# Patient Record
Sex: FEMALE | Race: WHITE | NOT HISPANIC OR LATINO | Employment: FULL TIME | URBAN - METROPOLITAN AREA
[De-identification: names, ages, dates, MRNs, and addresses within clinical notes are randomized per-mention and may not be internally consistent; named-entity substitution may affect disease eponyms.]

---

## 2017-04-13 ENCOUNTER — HOSPITAL ENCOUNTER (OUTPATIENT)
Dept: LAB | Facility: MEDICAL CENTER | Age: 37
End: 2017-04-13
Attending: OBSTETRICS & GYNECOLOGY
Payer: COMMERCIAL

## 2017-04-13 LAB
HBV SURFACE AB SERPL IA-ACNC: >1000 MIU/ML (ref 0–10)
HIV 1+2 AB+HIV1 P24 AG SERPL QL IA: NON REACTIVE
TREPONEMA PALLIDUM IGG+IGM AB [PRESENCE] IN SERUM OR PLASMA BY IMMUNOASSAY: NON REACTIVE

## 2017-04-13 PROCEDURE — 86706 HEP B SURFACE ANTIBODY: CPT

## 2017-04-13 PROCEDURE — 87340 HEPATITIS B SURFACE AG IA: CPT

## 2017-04-13 PROCEDURE — 87389 HIV-1 AG W/HIV-1&-2 AB AG IA: CPT

## 2017-04-13 PROCEDURE — 86780 TREPONEMA PALLIDUM: CPT

## 2017-04-15 LAB — HBV SURFACE AG SER QL: NEGATIVE

## 2017-08-16 ENCOUNTER — HOSPITAL ENCOUNTER (OUTPATIENT)
Facility: MEDICAL CENTER | Age: 37
End: 2017-08-16
Attending: FAMILY MEDICINE
Payer: COMMERCIAL

## 2017-10-27 ENCOUNTER — NON-PROVIDER VISIT (OUTPATIENT)
Dept: URGENT CARE | Facility: CLINIC | Age: 37
End: 2017-10-27

## 2017-10-27 DIAGNOSIS — Z02.1 PRE-EMPLOYMENT DRUG SCREENING: ICD-10-CM

## 2017-10-27 LAB
AMP AMPHETAMINE: NORMAL
COC COCAINE: NORMAL
INT CON NEG: NORMAL
INT CON POS: NORMAL
MET METHAMPHETAMINES: NORMAL
OPI OPIATES: NORMAL
PCP PHENCYCLIDINE: NORMAL
POC DRUG COMMENT 753798-OCCUPATIONAL HEALTH: NORMAL
THC: NORMAL

## 2017-10-27 PROCEDURE — 80305 DRUG TEST PRSMV DIR OPT OBS: CPT | Performed by: PHYSICIAN ASSISTANT

## 2018-07-06 DIAGNOSIS — Z01.812 PRE-OPERATIVE LABORATORY EXAMINATION: ICD-10-CM

## 2018-07-06 LAB
ANION GAP SERPL CALC-SCNC: 8 MMOL/L (ref 0–11.9)
BUN SERPL-MCNC: 10 MG/DL (ref 8–22)
CALCIUM SERPL-MCNC: 9.4 MG/DL (ref 8.5–10.5)
CHLORIDE SERPL-SCNC: 103 MMOL/L (ref 96–112)
CO2 SERPL-SCNC: 24 MMOL/L (ref 20–33)
CREAT SERPL-MCNC: 0.69 MG/DL (ref 0.5–1.4)
ERYTHROCYTE [DISTWIDTH] IN BLOOD BY AUTOMATED COUNT: 40.9 FL (ref 35.9–50)
GLUCOSE SERPL-MCNC: 133 MG/DL (ref 65–99)
HCT VFR BLD AUTO: 39.2 % (ref 37–47)
HGB BLD-MCNC: 13.1 G/DL (ref 12–16)
MCH RBC QN AUTO: 30.5 PG (ref 27–33)
MCHC RBC AUTO-ENTMCNC: 33.4 G/DL (ref 33.6–35)
MCV RBC AUTO: 91.4 FL (ref 81.4–97.8)
PLATELET # BLD AUTO: 380 K/UL (ref 164–446)
PMV BLD AUTO: 9.7 FL (ref 9–12.9)
POTASSIUM SERPL-SCNC: 3.8 MMOL/L (ref 3.6–5.5)
RBC # BLD AUTO: 4.29 M/UL (ref 4.2–5.4)
SODIUM SERPL-SCNC: 135 MMOL/L (ref 135–145)
WBC # BLD AUTO: 9 K/UL (ref 4.8–10.8)

## 2018-07-06 PROCEDURE — 85027 COMPLETE CBC AUTOMATED: CPT

## 2018-07-06 PROCEDURE — 36415 COLL VENOUS BLD VENIPUNCTURE: CPT

## 2018-07-06 PROCEDURE — 80048 BASIC METABOLIC PNL TOTAL CA: CPT

## 2018-07-06 NOTE — H&P
IDENTIFICATION:  This is a 38-year-old  3, para 2-0-1-2 with a last   menstrual period of 2018, who presents with a chief complaint of undesired   fertility and multiparity, desires permanent sterilization.    HISTORY OF PRESENT ILLNESS:  This is a patient of mine who has already had an   ablation as well as prior C-sections x2.  She desires permanent sterilization   at this time.  She has no other complaints.  No nausea, vomiting, fever,   chills, change in bowel or bladder habits and she is in a stable monogamous   relationship and cannot imagine adding to her family, she would like to move   forward with bilateral salpingectomy after careful counseling.    OBSTETRICAL HISTORY:  Significant for 2 prior  sections.    GYNECOLOGIC HISTORY:  Most recent Pap is normal.  History of endometrial   ablation in the past.    ALLERGIES:  IMITREX.    CURRENT MEDICATIONS:  Allegra, Loestrin and prenatal vitamin.    MEDICAL PROBLEMS:  History of migraine headaches.    PAST SURGICAL HISTORY:  Endometrial ablation and history of  x2.    SOCIAL HISTORY:  Denies alcohol abuse or tobacco use.    FAMILY HISTORY:  Noncontributory.    REVIEW OF SYSTEMS:  Review of systems x12 is negative per AMA standards   available in chart.    LABORATORY DATA:  Pending.    PHYSICAL EXAMINATION:  VITAL SIGNS:  Patient is afebrile.  Vital signs within normal limits.  GENERAL:  She is awake, alert, in no apparent distress.  NECK:  Supple.  HEART:  Regular.  CHEST:  Clear.  BREASTS:  Symmetrical.  ABDOMEN:  Soft, nontender, positive bowel sounds x4.  EXTREMITIES:  No cyanosis, clubbing, or edema.  GYNECOLOGIC:  EGBUS within normal limits.  No perineal lesions.  Vagina is   pink and moist.  Cervix is midline without discharge or cervical motion   tenderness.  Uterus midline and anteverted.  No adnexal masses.    ASSESSMENT:  1.  At this time is undesired fertility, multiparity, desires permanent   sterilization.  2.  History of  endometrial ablation.    PLAN:  At this time, patient has been extensively counseled on risks,   benefits, complications, and alternatives of surgery, which include but are   not limited to risk of anesthesia, risk of injury to bowel, bladder, ureters,   major vessels, nerves, and pelvis, as well as risk of blood clots in her legs   and lungs and subsequent postop pneumonia.  She accepts these risks.  She also   realizes that by removing her tubes, she will no longer be able to get   pregnant spontaneously, although this is not recommended post ablation.  She   realizes that there are alternative methods of birth control including but not   limited to pills, patches, vasectomy for her spouse.  She, however, believes   that permanent sterilization is the best method of birth control for her.  She   also realizes that by removing her tubes is approximately 1 in 200 tubal   failure rate and increased risk of ectopic pregnancy with a decreased risk of   pregnancy overall.  She also understands that by removing her tubes will   decrease her risk of ovarian cancer in the future, but not presented.  She is   quite comfortable with all of this and plan subsequently a diagnostic   laparoscopy and bilateral salpingectomy.       ____________________________________     MD LOUISA Wade / NEO    DD:  07/06/2018 13:55:14  DT:  07/06/2018 14:11:00    D#:  [id number]  Job#:  [id number]

## 2018-07-06 NOTE — H&P
IDENTIFICATION:  This is a 38-year-old  3, para 2-0-1-2 with a last   menstrual period of 2018, who presents with a chief complaint of undesired   fertility and multiparity, desires permanent sterilization.    HISTORY OF PRESENT ILLNESS:  This is a patient of mine who has already had an   ablation as well as prior C-sections x2.  She desires permanent sterilization   at this time.  She has no other complaints.  No nausea, vomiting, fever,   chills, change in bowel or bladder habits and she is in a stable monogamous   relationship and cannot imagine adding to her family, she would like to move   forward with bilateral salpingectomy after careful counseling.    OBSTETRICAL HISTORY:  Significant for 2 prior  sections.    GYNECOLOGIC HISTORY:  Most recent Pap is normal.  History of endometrial   ablation in the past.    ALLERGIES:  IMITREX.    CURRENT MEDICATIONS:  Allegra, Loestrin and prenatal vitamin.    MEDICAL PROBLEMS:  History of migraine headaches.    PAST SURGICAL HISTORY:  Endometrial ablation and history of  x2.    SOCIAL HISTORY:  Denies alcohol abuse or tobacco use.    FAMILY HISTORY:  Noncontributory.    REVIEW OF SYSTEMS:  Review of systems x12 is negative per AMA standards   available in chart.    LABORATORY DATA:  Pending.    PHYSICAL EXAMINATION:  VITAL SIGNS:  Patient is afebrile.  Vital signs within normal limits.  GENERAL:  She is awake, alert, in no apparent distress.  NECK:  Supple.  HEART:  Regular.  CHEST:  Clear.  BREASTS:  Symmetrical.  ABDOMEN:  Soft, nontender, positive bowel sounds x4.  EXTREMITIES:  No cyanosis, clubbing, or edema.  GYNECOLOGIC:  EGBUS within normal limits.  No perineal lesions.  Vagina is   pink and moist.  Cervix is midline without discharge or cervical motion   tenderness.  Uterus midline and anteverted.  No adnexal masses.    ASSESSMENT:  1.  At this time is undesired fertility, multiparity, desires permanent   sterilization.  2.  History of  endometrial ablation.    PLAN:  At this time, patient has been extensively counseled on risks,   benefits, complications, and alternatives of surgery, which include but are   not limited to risk of anesthesia, risk of injury to bowel, bladder, ureters,   major vessels, nerves, and pelvis, as well as risk of blood clots in her legs   and lungs and subsequent postop pneumonia.  She accepts these risks.  She also   realizes that by removing her tubes, she will no longer be able to get   pregnant spontaneously, although this is not recommended post ablation.  She   realizes that there are alternative methods of birth control including but not   limited to pills, patches, vasectomy for her spouse.  She, however, believes   that permanent sterilization is the best method of birth control for her.  She   also realizes that by removing her tubes is approximately 1 in 200 tubal   failure rate and increased risk of ectopic pregnancy with a decreased risk of   pregnancy overall.  She also understands that by removing her tubes will   decrease her risk of ovarian cancer in the future, but not presented.  She is   quite comfortable with all of this and plan subsequently a diagnostic   laparoscopy and bilateral salpingectomy.       ____________________________________     MD LOUISA Wade / NEO    DD:  07/06/2018 13:55:14  DT:  07/06/2018 14:11:00    D#:  [id number]  Job#:  [id number]

## 2018-07-11 ENCOUNTER — HOSPITAL ENCOUNTER (OUTPATIENT)
Facility: MEDICAL CENTER | Age: 38
End: 2018-07-11
Attending: OBSTETRICS & GYNECOLOGY | Admitting: OBSTETRICS & GYNECOLOGY
Payer: COMMERCIAL

## 2018-07-11 VITALS
BODY MASS INDEX: 34.05 KG/M2 | OXYGEN SATURATION: 99 % | DIASTOLIC BLOOD PRESSURE: 72 MMHG | HEIGHT: 61 IN | TEMPERATURE: 97.5 F | SYSTOLIC BLOOD PRESSURE: 124 MMHG | HEART RATE: 65 BPM | WEIGHT: 180.34 LBS | RESPIRATION RATE: 16 BRPM

## 2018-07-11 DIAGNOSIS — G89.18 POST-OP PAIN: ICD-10-CM

## 2018-07-11 LAB
B-HCG FREE SERPL-ACNC: <5 MIU/ML
IHCGL IHCGL: NEGATIVE MIU/ML

## 2018-07-11 PROCEDURE — 501582 HCHG TROCAR, THRD BLADED: Performed by: OBSTETRICS & GYNECOLOGY

## 2018-07-11 PROCEDURE — 700111 HCHG RX REV CODE 636 W/ 250 OVERRIDE (IP)

## 2018-07-11 PROCEDURE — 160035 HCHG PACU - 1ST 60 MINS PHASE I: Performed by: OBSTETRICS & GYNECOLOGY

## 2018-07-11 PROCEDURE — 160036 HCHG PACU - EA ADDL 30 MINS PHASE I: Performed by: OBSTETRICS & GYNECOLOGY

## 2018-07-11 PROCEDURE — A6402 STERILE GAUZE <= 16 SQ IN: HCPCS | Performed by: OBSTETRICS & GYNECOLOGY

## 2018-07-11 PROCEDURE — 88302 TISSUE EXAM BY PATHOLOGIST: CPT

## 2018-07-11 PROCEDURE — 160029 HCHG SURGERY MINUTES - 1ST 30 MINS LEVEL 4: Performed by: OBSTETRICS & GYNECOLOGY

## 2018-07-11 PROCEDURE — 500868 HCHG NEEDLE, SURGI(VARES): Performed by: OBSTETRICS & GYNECOLOGY

## 2018-07-11 PROCEDURE — 700101 HCHG RX REV CODE 250

## 2018-07-11 PROCEDURE — 700102 HCHG RX REV CODE 250 W/ 637 OVERRIDE(OP)

## 2018-07-11 PROCEDURE — 502704 HCHG DEVICE, LIGASURE IMPACT: Performed by: OBSTETRICS & GYNECOLOGY

## 2018-07-11 PROCEDURE — 501570 HCHG TROCAR, SEPARATOR: Performed by: OBSTETRICS & GYNECOLOGY

## 2018-07-11 PROCEDURE — 160041 HCHG SURGERY MINUTES - EA ADDL 1 MIN LEVEL 4: Performed by: OBSTETRICS & GYNECOLOGY

## 2018-07-11 PROCEDURE — A9270 NON-COVERED ITEM OR SERVICE: HCPCS

## 2018-07-11 PROCEDURE — 160002 HCHG RECOVERY MINUTES (STAT): Performed by: OBSTETRICS & GYNECOLOGY

## 2018-07-11 PROCEDURE — 84702 CHORIONIC GONADOTROPIN TEST: CPT

## 2018-07-11 PROCEDURE — 160048 HCHG OR STATISTICAL LEVEL 1-5: Performed by: OBSTETRICS & GYNECOLOGY

## 2018-07-11 PROCEDURE — 160009 HCHG ANES TIME/MIN: Performed by: OBSTETRICS & GYNECOLOGY

## 2018-07-11 PROCEDURE — 500886 HCHG PACK, LAPAROSCOPY: Performed by: OBSTETRICS & GYNECOLOGY

## 2018-07-11 PROCEDURE — 501838 HCHG SUTURE GENERAL: Performed by: OBSTETRICS & GYNECOLOGY

## 2018-07-11 RX ORDER — IBUPROFEN 400 MG/1
800 TABLET ORAL
Status: DISCONTINUED | OUTPATIENT
Start: 2018-07-11 | End: 2018-07-11 | Stop reason: HOSPADM

## 2018-07-11 RX ORDER — BUPIVACAINE HYDROCHLORIDE AND EPINEPHRINE 5; 5 MG/ML; UG/ML
INJECTION, SOLUTION EPIDURAL; INTRACAUDAL; PERINEURAL
Status: DISCONTINUED | OUTPATIENT
Start: 2018-07-11 | End: 2018-07-11 | Stop reason: HOSPADM

## 2018-07-11 RX ORDER — ONDANSETRON 2 MG/ML
4 INJECTION INTRAMUSCULAR; INTRAVENOUS EVERY 6 HOURS PRN
Status: DISCONTINUED | OUTPATIENT
Start: 2018-07-11 | End: 2018-07-11 | Stop reason: HOSPADM

## 2018-07-11 RX ORDER — IBUPROFEN 800 MG/1
800 TABLET ORAL
Qty: 30 TAB | Refills: 1 | Status: SHIPPED | OUTPATIENT
Start: 2018-07-11 | End: 2020-04-26

## 2018-07-11 RX ORDER — MORPHINE SULFATE 4 MG/ML
2 INJECTION, SOLUTION INTRAMUSCULAR; INTRAVENOUS
Status: DISCONTINUED | OUTPATIENT
Start: 2018-07-11 | End: 2018-07-11 | Stop reason: HOSPADM

## 2018-07-11 RX ORDER — SODIUM CHLORIDE, SODIUM LACTATE, POTASSIUM CHLORIDE, CALCIUM CHLORIDE 600; 310; 30; 20 MG/100ML; MG/100ML; MG/100ML; MG/100ML
INJECTION, SOLUTION INTRAVENOUS CONTINUOUS
Status: DISCONTINUED | OUTPATIENT
Start: 2018-07-11 | End: 2018-07-11 | Stop reason: HOSPADM

## 2018-07-11 RX ORDER — BUPIVACAINE HYDROCHLORIDE AND EPINEPHRINE 5; 5 MG/ML; UG/ML
INJECTION, SOLUTION EPIDURAL; INTRACAUDAL; PERINEURAL
Status: DISCONTINUED
Start: 2018-07-11 | End: 2018-07-11 | Stop reason: HOSPADM

## 2018-07-11 RX ORDER — OXYCODONE HYDROCHLORIDE 5 MG/1
2.5 TABLET ORAL
Status: DISCONTINUED | OUTPATIENT
Start: 2018-07-11 | End: 2018-07-11 | Stop reason: HOSPADM

## 2018-07-11 RX ORDER — OXYCODONE HYDROCHLORIDE 5 MG/1
5 TABLET ORAL
Status: DISCONTINUED | OUTPATIENT
Start: 2018-07-11 | End: 2018-07-11 | Stop reason: HOSPADM

## 2018-07-11 RX ORDER — OXYCODONE HYDROCHLORIDE 5 MG/1
5 TABLET ORAL
Qty: 15 TAB | Refills: 0 | Status: SHIPPED | OUTPATIENT
Start: 2018-07-11 | End: 2018-07-16

## 2018-07-11 RX ORDER — OXYCODONE HCL 5 MG/5 ML
SOLUTION, ORAL ORAL
Status: COMPLETED
Start: 2018-07-11 | End: 2018-07-11

## 2018-07-11 RX ORDER — ACETAMINOPHEN 500 MG
1000 TABLET ORAL EVERY 6 HOURS
Status: DISCONTINUED | OUTPATIENT
Start: 2018-07-11 | End: 2018-07-11 | Stop reason: HOSPADM

## 2018-07-11 RX ADMIN — SODIUM CHLORIDE, SODIUM LACTATE, POTASSIUM CHLORIDE, CALCIUM CHLORIDE: 600; 310; 30; 20 INJECTION, SOLUTION INTRAVENOUS at 13:00

## 2018-07-11 RX ADMIN — FENTANYL CITRATE 50 MCG: 50 INJECTION, SOLUTION INTRAMUSCULAR; INTRAVENOUS at 14:55

## 2018-07-11 RX ADMIN — OXYCODONE HYDROCHLORIDE 10 MG: 5 SOLUTION ORAL at 14:55

## 2018-07-11 ASSESSMENT — PAIN SCALES - GENERAL
PAINLEVEL_OUTOF10: 3
PAINLEVEL_OUTOF10: 4
PAINLEVEL_OUTOF10: 3
PAINLEVEL_OUTOF10: 0

## 2018-07-11 NOTE — OR NURSING
1433  RECEIVED PATIENT FROM OR.  REPORT FROM DR. HAGEN.  NO AIRWAY IN PLACE.  RESPIRATIONS ARE EVEN AND UNLABORED.  2 LAP STABS TO ABDOMEN COVERED WITH GAUZE AND TEGADERM ARE CDI.  KEISHA PAD IN PLACE.       1455  MEDICATED WITH IV AND PO PAIN MEDICATION.  VOIDED 100 CC CLEAR, YELLOW URINE IN BEDPAN.    1536  UP GETTING DRESSED.     1554  DISCHARGED.  DISCHARGE INSTRUCTIONS GIVEN TO PATIENT AND HER  GABRIELLE.  A VERBAL UNDERSTANDING OF ALL INSTRUCTIONS WAS STATED.  PATIENT TAKING PO, VOIDING AND AMBULATING WITHOUT DIFFICULTY.  PATIENT STATES SHE IS READY TO GO HOME.  NO DRAINAGE TO KEISHA PAD.

## 2018-07-11 NOTE — DISCHARGE INSTRUCTIONS
ACTIVITY: Rest and take it easy for the first 24 hours.  A responsible adult is recommended to remain with you during that time.  It is normal to feel sleepy.  We encourage you to not do anything that requires balance, judgment or coordination.    MILD FLU-LIKE SYMPTOMS ARE NORMAL. YOU MAY EXPERIENCE GENERALIZED MUSCLE ACHES, THROAT IRRITATION, HEADACHE AND/OR SOME NAUSEA.    FOR 24 HOURS DO NOT:  Drive, operate machinery or run household appliances.  Drink beer or alcoholic beverages.   Make important decisions or sign legal documents.    SPECIAL INSTRUCTIONS: *SEE PELVISCOPY INSTRUCTION SHEET  PELVIC REST:  NO TAMPONS, NO DOUCHING AND NO SEXUAL INTERCOURSE UNTIL APPROVED BY PHYSICIAN  FOLLOW UP IN EMERGENCY FOR SIGNS OF INFECTION OR ANEMIA*    DIET: To avoid nausea, slowly advance diet as tolerated, avoiding spicy or greasy foods for the first day.  Add more substantial food to your diet according to your physician's instructions.  Babies can be fed formula or breast milk as soon as they are hungry.  INCREASE FLUIDS AND FIBER TO AVOID CONSTIPATION.    SURGICAL DRESSING/BATHING: *MAY SHOWER TOMORROW.  NO TUB BATHS, HOT TUBS OR SWIMMING UNTIL APPROVED BY PHYSICIAN*    FOLLOW-UP APPOINTMENT:  A follow-up appointment should be arranged with your doctor in **FOLLOW UP WITH DR. TREJO IN 2 WEEKS*; call to schedule.    You should CALL YOUR PHYSICIAN if you develop:  Fever greater than 101 degrees F.  Pain not relieved by medication, or persistent nausea or vomiting.  Excessive bleeding (blood soaking through dressing) or unexpected drainage from the wound.  Extreme redness or swelling around the incision site, drainage of pus or foul smelling drainage.  Inability to urinate or empty your bladder within 8 hours.  Problems with breathing or chest pain.    You should call 911 if you develop problems with breathing or chest pain.  If you are unable to contact your doctor or surgical center, you should go to the nearest  emergency room or urgent care center.  Physician's telephone #: **[phone]*    If any questions arise, call your doctor.  If your doctor is not available, please feel free to call the Surgical Center at [phone].  The Center is open Monday through Friday from 7AM to 7PM.  You can also call the HEALTH HOTLINE open 24 hours/day, 7 days/week and speak to a nurse at [phone], or toll free at [phone].    A registered nurse may call you a few days after your surgery to see how you are doing after your procedure.    MEDICATIONS: Resume taking daily medication.  Take prescribed pain medication with food.  If no medication is prescribed, you may take non-aspirin pain medication if needed.  PAIN MEDICATION CAN BE VERY CONSTIPATING.  Take a stool softener or laxative such as senokot, pericolace, or milk of magnesia if needed.    Prescription given for *ROXICODONE AND MOTRIN**.  Last pain medication given at *2:55 PM**.    If your physician has prescribed pain medication that includes Acetaminophen (Tylenol), do not take additional Acetaminophen (Tylenol) while taking the prescribed medication.    Depression / Suicide Risk    As you are discharged from this [facility] facility, it is important to learn how to keep safe from harming yourself.    Recognize the warning signs:  · Abrupt changes in personality, positive or negative- including increase in energy   · Giving away possessions  · Change in eating patterns- significant weight changes-  positive or negative  · Change in sleeping patterns- unable to sleep or sleeping all the time   · Unwillingness or inability to communicate  · Depression  · Unusual sadness, discouragement and loneliness  · Talk of wanting to die  · Neglect of personal appearance   · Rebelliousness- reckless behavior  · Withdrawal from people/activities they love  · Confusion- inability to concentrate     If you or a loved one observes any of these behaviors or has concerns about  self-harm, here's what you can do:  · Talk about it- your feelings and reasons for harming yourself  · Remove any means that you might use to hurt yourself (examples: pills, rope, extension cords, firearm)  · Get professional help from the community (Mental Health, Substance Abuse, psychological counseling)  · Do not be alone:Call your Safe Contact- someone whom you trust who will be there for you.  · Call your local CRISIS HOTLINE 086-0855 or 142-938-0445  · Call your local Children's Mobile Crisis Response Team [place] (920) 482-1474 or www.[employer]  · Call the toll free National Suicide Prevention Hotlines   · National Suicide Prevention Lifeline 740-763-URUK (3043)  · National Hope Line Network 800-SUICIDE (652-3854)

## 2018-07-11 NOTE — OP REPORT
DATE OF SERVICE:  07/11/2018    PREOPERATIVE DIAGNOSIS:  Undesired fertility and multiparity, desires   bilateral salpingectomy.    POSTOPERATIVE DIAGNOSIS:  Undesired fertility and multiparity, desires   bilateral salpingectomy.    PROCEDURE:  Laparoscopy, bilateral salpingectomy.    SURGEON:  Enrrique Vaz MD    ASSISTANT:  None.    ANESTHESIOLOGIST:  Patrick Monroy MD    ANESTHESIA:  General.    SPECIMENS:  Bilateral tubes.    ESTIMATED BLOOD LOSS:  Less than 10 mL    FINDINGS:  1.  Normal appearing uterus, tubes, ovaries, liver, gallbladder, appendix.  2.  Omental adhesions to previous abdominal scar.    COMPLICATIONS:  None.    DISPOSITION:  Stable.    PROCEDURE IN DETAIL:  Informed consent was obtained today.  Again, I reviewed   with the patient the method of sterilization today, why we would take her   tubes out versus simply cutting her tubes in the context of decreased risk of   ovarian cancer and permanent sterilization that is nonreversible.  We   discussed effects of tubal ligation on her body and after careful counseling   and consideration, patient also inquired as to why we want to proceeding with   hysterectomy at this time.  I informed her that she had no symptoms of uterine   abnormalities and that this was an elective case and that she was desiring   permanent sterilization and that she already had an ablation.  After this   discussion, the patient consented to move forward with bilateral   salpingectomy.    The patient was taken to the operating room where general anesthesia was found   to be adequate.  She was then prepped and draped in normal sterile fashion in   dorsal lithotomy position.  [place] speculum was placed in her vagina.    Cervix was grasped with single tooth tenaculum and acorn manipulator was   placed.  The speculum was removed.  We repositioned the patient's legs,   changed gloves and proceeded to the abdominal portion of the case.  The   operative sites were preinjected with  0.25% Marcaine with epinephrine.  A   small infraumbilical incision was made with a scalpel.  The Veress needle was   used to obtain intraperitoneal access.  This was confirmed with the saline   drop test.  Pneumoperitoneum was then obtained.  The Veress was removed.  The   12 mm port with obturator replaced it.  The obturator was removed and scope   replaced it.  The aforementioned findings were noted.  There was a large   omental adhesion which was taken down after visualizing its path with a   LigaSure.  At this point, a 5 mm suprapubic port was placed under direct   visualization.  Tubes, ovaries, uterus appeared normal.  Appendix appeared   normal.  Liver, gallbladder appeared normal.  At this point, after identifying   the course of the ureters bilaterally, the left tube was traced to its   fimbriated end and starting at the fimbria using the LigaSure working serially   staying well away from pelvic sidewall and the uteroovarian ligaments or IP   ligaments, the left tube was excised all the way to the level of the cornua   and removed through the umbilical port, hemostasis was assured.  The right   tube was traced out and removed in a similar fashion, removed through the   umbilical port and hemostasis was assured.  At this point, gas was allowed to   escape from the pelvis.  The ____ salpingectomy sites were found to be   hemostatic at atmospheric pressure.  All instruments were removed under direct   visualization.  All ports were removed under direct visualization.  The   infraumbilical port was closed at the fascial levels with a simple stitch of 0   Vicryl.  The skin was closed with 4-0 Vicryl and subcuticular stitches.  All   instruments were removed from the vagina and hemostasis was assured.  The   patient tolerated the procedure well.  Sponge, lap, and needle counts were   correct x3.  Patient was taken to recovery in stable condition.  There were no   complications.        ____________________________________     MD LOUISA Wade / NEO    DD:  07/11/2018 14:32:51  DT:  07/11/2018 15:21:39    D#:  [id number]  Job#:  [id number]

## 2018-07-11 NOTE — OR SURGEON
Immediate Post OP Note    PreOp Diagnosis: undesired fertility; desired bilat salp    PostOp Diagnosis: same    Procedure(s):  PELVISCOPY - Wound Class: Clean  SALPINGECTOMY - Wound Class: Clean    Surgeon(s):  Enrrique Gale M.D.    Anesthesiologist/Type of Anesthesia:  Anesthesiologist: Patrick Monroy M.D./General    Surgical Staff:  Circulator: Lluvia Hatfield R.N.  Scrub Person: Noy Munoz; Michelle Perrin    Specimens removed if any:  * No specimens in log *    Estimated Blood Loss: less than 10    Findings: wnl u/t/o/l/gb/cinthya; omental adhesion    Complications: none        7/11/2018 2:26 PM Enrrique Gale M.D.

## 2018-11-20 ENCOUNTER — HOSPITAL ENCOUNTER (OUTPATIENT)
Dept: LAB | Facility: MEDICAL CENTER | Age: 38
End: 2018-11-20
Attending: FAMILY MEDICINE

## 2018-11-20 PROCEDURE — 81003 URINALYSIS AUTO W/O SCOPE: CPT

## 2018-11-21 LAB
APPEARANCE UR: CLEAR
BILIRUB UR QL STRIP.AUTO: NEGATIVE
COLOR UR: YELLOW
GLUCOSE UR STRIP.AUTO-MCNC: NEGATIVE MG/DL
KETONES UR STRIP.AUTO-MCNC: NEGATIVE MG/DL
LEUKOCYTE ESTERASE UR QL STRIP.AUTO: NEGATIVE
MICRO URNS: NORMAL
NITRITE UR QL STRIP.AUTO: NEGATIVE
PH UR STRIP.AUTO: 7 [PH]
PROT UR QL STRIP: NEGATIVE MG/DL
RBC UR QL AUTO: NEGATIVE
SP GR UR STRIP.AUTO: 1.02
UROBILINOGEN UR STRIP.AUTO-MCNC: 0.2 MG/DL

## 2020-04-26 ENCOUNTER — OFFICE VISIT (OUTPATIENT)
Dept: URGENT CARE | Facility: CLINIC | Age: 40
End: 2020-04-26
Payer: COMMERCIAL

## 2020-04-26 VITALS
DIASTOLIC BLOOD PRESSURE: 74 MMHG | OXYGEN SATURATION: 96 % | HEART RATE: 112 BPM | TEMPERATURE: 98.8 F | RESPIRATION RATE: 15 BRPM | SYSTOLIC BLOOD PRESSURE: 106 MMHG

## 2020-04-26 DIAGNOSIS — N39.0 ACUTE URINARY TRACT INFECTION: ICD-10-CM

## 2020-04-26 LAB
APPEARANCE UR: CLEAR
BILIRUB UR STRIP-MCNC: NORMAL MG/DL
COLOR UR AUTO: YELLOW
GLUCOSE UR STRIP.AUTO-MCNC: NORMAL MG/DL
KETONES UR STRIP.AUTO-MCNC: NORMAL MG/DL
LEUKOCYTE ESTERASE UR QL STRIP.AUTO: NORMAL
NITRITE UR QL STRIP.AUTO: POSITIVE
PH UR STRIP.AUTO: 6 [PH] (ref 5–8)
PROT UR QL STRIP: NORMAL MG/DL
RBC UR QL AUTO: NORMAL
SP GR UR STRIP.AUTO: 1.02
UROBILINOGEN UR STRIP-MCNC: 0.2 MG/DL

## 2020-04-26 PROCEDURE — 81002 URINALYSIS NONAUTO W/O SCOPE: CPT | Performed by: FAMILY MEDICINE

## 2020-04-26 PROCEDURE — 99203 OFFICE O/P NEW LOW 30 MIN: CPT | Performed by: FAMILY MEDICINE

## 2020-04-26 RX ORDER — SULFAMETHOXAZOLE AND TRIMETHOPRIM 800; 160 MG/1; MG/1
TABLET ORAL
Qty: 14 TAB | Refills: 0 | Status: SHIPPED | OUTPATIENT
Start: 2020-04-26 | End: 2020-12-15

## 2020-04-26 RX ORDER — PHENAZOPYRIDINE HYDROCHLORIDE 200 MG/1
TABLET, FILM COATED ORAL
Qty: 9 TAB | Refills: 0 | Status: SHIPPED | OUTPATIENT
Start: 2020-04-26 | End: 2020-09-10

## 2020-04-26 RX ORDER — NITROFURANTOIN 25; 75 MG/1; MG/1
100 CAPSULE ORAL EVERY 12 HOURS
Qty: 10 CAP | Refills: 0 | Status: CANCELLED | OUTPATIENT
Start: 2020-04-26 | End: 2020-05-01

## 2020-04-27 NOTE — PROGRESS NOTES
Chief Complaint:    Chief Complaint   Patient presents with   • UTI       History of Present Illness:    This is a new problem. Symptoms started today. Has dysuria, urinary frequency, and discomfort in bladder region, overall at least moderate severity and not getting better. She reports she gets 2-3 UTIs a year, sometimes precipitated by holding urine. She recently drove to [place], [place] and reports she held her urine longer than she should have. Bactrim and Pyridium have worked well and been tolerated in past for similar previous problem. She thinks she takes Bactrim longer than 3 days typically. She does not get periods anymore, had uterine ablation.      Review of Systems:    Constitutional: Negative for fever, chills, and diaphoresis.   Eyes: Negative for change in vision, photophobia, pain, redness, and discharge.  ENT: Negative for ear pain, ear discharge, hearing loss, tinnitus, nasal congestion, nosebleeds, and sore throat.    Respiratory: Negative for cough, hemoptysis, sputum production, shortness of breath, wheezing, and stridor.    Cardiovascular: Negative for chest pain, palpitations, orthopnea, claudication, leg swelling, and PND.   Gastrointestinal: See HPI.  Genitourinary: See HPI.  Musculoskeletal: Negative for myalgias, joint pain, neck pain, and back pain.   Skin: Negative for rash and itching.   Neurological: Negative for dizziness, tingling, tremors, sensory change, speech change, focal weakness, seizures, loss of consciousness, and headaches.   Endo: Negative for polydipsia.   Heme: Does not bruise/bleed easily.   Psychiatric/Behavioral: Negative for depression, suicidal ideas, hallucinations, memory loss and substance abuse. The patient is not nervous/anxious and does not have insomnia.        Past Medical History:    Past Medical History:   Diagnosis Date   • Arthritis     hips   • Heart burn    • Other specified symptom associated with female genital organs     ovarian cysts     Past Surgical  History:    Past Surgical History:   Procedure Laterality Date   • PELVISCOPY N/A 7/11/2018    Procedure: PELVISCOPY;  Surgeon: Enrrique Gale M.D.;  Location: SURGERY SAME DAY [facility];  Service: Gynecology   • SALPINGECTOMY Bilateral 7/11/2018    Procedure: SALPINGECTOMY;  Surgeon: Enrrique Gale M.D.;  Location: SURGERY SAME DAY [facility];  Service: Gynecology   • HYSTEROSCOPY NOVASURE-2  9/2/2009    Performed by ENRRIQUE GALE at SURGERY SAME DAY [facility] ORS   • PRIMARY C SECTION  2003,2007     Social History:    Social History     Socioeconomic History   • Marital status: Single     Spouse name: Not on file   • Number of children: Not on file   • Years of education: Not on file   • Highest education level: Not on file   Occupational History   • Not on file   Social Needs   • Financial resource strain: Not on file   • Food insecurity     Worry: Not on file     Inability: Not on file   • Transportation needs     Medical: Not on file     Non-medical: Not on file   Tobacco Use   • Smoking status: Never Smoker   • Smokeless tobacco: Never Used   Substance and Sexual Activity   • Alcohol use: Yes     Comment: occ   • Drug use: No   • Sexual activity: Not on file   Lifestyle   • Physical activity     Days per week: Not on file     Minutes per session: Not on file   • Stress: Not on file   Relationships   • Social connections     Talks on phone: Not on file     Gets together: Not on file     Attends [employer] service: Not on file     Active member of club or organization: Not on file     Attends meetings of clubs or organizations: Not on file     Relationship status: Not on file   • Intimate partner violence     Fear of current or ex partner: Not on file     Emotionally abused: Not on file     Physically abused: Not on file     Forced sexual activity: Not on file   Other Topics Concern   • Not on file   Social History Narrative   • Not on file     Family History:    Family History   Problem Relation Age of  Onset   • Cancer Paternal Aunt    • Cancer Paternal Aunt      Medications:    Current Outpatient Medications on File Prior to Visit   Medication Sig Dispense Refill   • Fexofenadine HCl (ALLEGRA PO) Take  by mouth as needed.       No current facility-administered medications on file prior to visit.      Allergies:    Allergies   Allergen Reactions   • Imitrex [Sumatriptan] Hives   • Pcn [Penicillins] Hives       Vitals:    Vitals:    04/26/20 2113   BP: 106/74   Pulse: (!) 112   Resp: 15   Temp: 37.1 °C (98.8 °F)   TempSrc: Temporal   SpO2: 96%       Physical Exam:    Constitutional: Vital signs reviewed. Appears well-developed and well-nourished. No acute distress.   Eyes: Sclera white, conjunctivae clear.   ENT: External ears normal. Hearing normal.  Pulmonary/Chest: Respirations non-labored.   Abdomen: Bowel sounds are normal active. Soft, non-distended, and mildly tender to palpation in bladder region.    Musculoskeletal: No CVA TTP bilaterally. Normal gait. Normal range of motion. No muscular atrophy or weakness.  Neurological: Alert and oriented to person, place, and time. Muscle tone normal. Coordination normal. Light touch and sensation normal.   Skin: No rashes or lesions. Warm, dry, normal turgor.  Psychiatric: Normal mood and affect. Behavior is normal. Judgment and thought content normal.       Diagnostics:    POCT Urinalysis   Order: [id number]   Status:  Final result   Visible to patient:  No (Not Released) Next appt:  None Dx:  Acute urinary tract infection    Ref Range & Units 9:19 PM 9yr ago   POC Color Negative YELLOW  pink orange    POC Appearance Negative CLEAR  clear    POC Leukocyte Esterase Negative MODERATE  mod    POC Nitrites Negative POSITIVE  neg    POC Urobiligen Negative (0.2) mg/dL 0.2  neg    POC Protein Negative mg/dL NEG  neg    POC Urine PH 5.0 - 8.0 6.0  5.0    POC Blood Negative TRACE  lg    POC Specific Gravity <1.005 - >1.030 1.025  1.005    POC Ketones Negative mg/dL NEG  neg     POC Bilirubin Negative mg/dL NEG  neg    POC Glucose Negative mg/dL NEG  neg          Specimen Collected: 04/26/20  9:19 PM Last Resulted: 04/26/20  9:50 PM             Assessment / Plan:    1. Acute urinary tract infection  - POCT Urinalysis  - sulfamethoxazole-trimethoprim (BACTRIM DS) 800-160 MG tablet; 1 TAB BY MOUTH TWICE A DAY FOR 5-7 DAYS.  Dispense: 14 Tab; Refill: 0  - phenazopyridine (PYRIDIUM) 200 MG Tab; 1 TAB BY MOUTH UP TO 3 TIMES A DAY ONLY IF NEEDED FOR BLADDER OR URINARY PAIN. WILL TURN URINE ORANGE.  Dispense: 9 Tab; Refill: 0      Discussed with her DDX, management options, and risks, benefits, and alternatives to treatment plan agreed upon.    Declines urine culture.    Agreeable to medications prescribed.    Discussed expected course of duration, time for improvement, and to seek follow-up in Emergency Room, urgent care, or with PCP if getting worse at any time or not improving within expected time frame.

## 2020-09-08 ENCOUNTER — TELEPHONE (OUTPATIENT)
Dept: SCHEDULING | Facility: IMAGING CENTER | Age: 40
End: 2020-09-08

## 2020-09-09 NOTE — PROGRESS NOTES
FAMILY MEDICINE VISIT                                                               Chief complaint::Diagnoses of Prediabetes, Mixed hyperlipidemia, Vitamin D deficiency, Seasonal allergies, Recurrent UTI, Class 2 obesity due to excess calories without serious comorbidity with body mass index (BMI) of 37.0 to 37.9 in adult, and Need for vaccination were pertinent to this visit.    History of present illness: Beena Wilburn is a 40 y.o. female who presented to establish care, to talk about multiple complaints follow-up.    Prediabetes  This is a new problem for this patient.  Patient reports that she has not been feeling well since July.  She reports that she feels lot tired, drinking a lot of water, waking up at nighttime for urination, nausea, hot flashes.  She reports that she went to her previous primary care physician and had blood work done.  She had blood work done at [facility], we requested labs and her labs showed A1c 6.1.  Her fasting glucose came back 111.  She is currently not on any medication for prediabetes.    Mixed hyperlipidemia  This is a new problem for this patient.  Her recent blood work showed elevated total cholesterol at 228, triglyceride at 321 and LDL at 126, HDL 45.  She reports that her eating habits are not good.  She reports that she has teenagers at home who eat a lot of junk food.  She does not exercise.    Vitamin D deficiency  This is a new problem for this patient.  Her recent blood work showed vitamin D 22.3.  She is not taking any vitamin D supplementation.    Seasonal allergies  This is a chronic problem for this patient.  She takes Claritin as needed for her symptoms.    Recurrent UTI  This is a chronic problem for this patient.  She follows with OB/GYN for recurrent UTIs.  She reports that she had uterine ablation procedure previously.  She reports that she gets UTIs 3-4 times in a year.    Class 2 obesity due to excess calories without serious comorbidity with body mass  index (BMI) of 37.0 to 37.9 in adult  This is a chronic problem for this patient.  Patient reports that she recently gained around 15 pounds.  She reports that she does not eat healthy diet and she does not exercise.  She reports that her friend took some weight loss medication and she lost around 40 pounds.  She would like to talk to nutritionist and about medications for weight loss.      Health Maintenance:   Patient reports that she had Pap smear done last year by OB/GYN, was normal.    Review of systems:     Review of Systems   Constitutional: Positive for malaise/fatigue. Negative for chills, fever and weight loss.   HENT: Negative for ear discharge, ear pain, hearing loss and sinus pain.    Eyes: Negative for pain, discharge and redness.   Respiratory: Negative for cough, hemoptysis, sputum production, shortness of breath and wheezing.    Cardiovascular: Negative for chest pain, palpitations and leg swelling.   Gastrointestinal: Positive for nausea. Negative for abdominal pain, constipation, diarrhea and vomiting.   Genitourinary: Negative for dysuria, frequency and urgency.   Musculoskeletal: Negative for joint pain and myalgias.   Skin: Negative for itching and rash.   Neurological: Negative for dizziness, sensory change, focal weakness and headaches.   Endo/Heme/Allergies: Negative for environmental allergies.        Polydipsia and polyuria, hot flashes   Psychiatric/Behavioral: Negative for depression, substance abuse and suicidal ideas. The patient is not nervous/anxious.         Past Medical, Surgical and Family History:    Past Medical History:   Diagnosis Date   • Arthritis     hips   • Heart burn    • Migraine    • Other specified symptom associated with female genital organs     ovarian cysts     Past Surgical History:   Procedure Laterality Date   • PELVISCOPY N/A 7/11/2018    Procedure: PELVISCOPY;  Surgeon: Enrrique Gale M.D.;  Location: SURGERY SAME DAY [facility];  Service: Gynecology   •  SALPINGECTOMY Bilateral 7/11/2018    Procedure: SALPINGECTOMY;  Surgeon: Enrrique Gale M.D.;  Location: SURGERY SAME DAY [facility];  Service: Gynecology   • HYSTEROSCOPY NOVASURE-2  9/2/2009    Performed by ENRRIQUE GALE at SURGERY SAME DAY [facility]   • EYE SURGERY      Due to diplopia   • OTHER      Uterine ablation and tubal ligation   • PRIMARY C SECTION  2003,2007     Family History   Problem Relation Age of Onset   • Cancer Paternal Aunt         Breast cancer   • Cancer Paternal Aunt         Breast cancer   • No Known Problems Mother    • Cancer Maternal Aunt         Cervical cancer        Social History:    Social History     Tobacco Use   • Smoking status: Never Smoker   • Smokeless tobacco: Never Used   Substance Use Topics   • Alcohol use: Yes     Comment: occ   • Drug use: No        Medications and Allergies:     Current Outpatient Medications   Medication Sig Dispense Refill   • loratadine (CLARITIN) 10 MG Tab Take 10 mg by mouth every day.     • metFORMIN (GLUCOPHAGE) 500 MG Tab Take 1 tablet by mouth daily for 1 week, then take 1 tablet by mouth two time daily 180 Tab 1   • vitamin D, Ergocalciferol, (DRISDOL) 1.25 MG (12931 UT) Cap capsule Take 1 Cap by mouth every 7 days. 12 Cap 1   • sulfamethoxazole-trimethoprim (BACTRIM DS) 800-160 MG tablet 1 TAB BY MOUTH TWICE A DAY FOR 5-7 DAYS. 14 Tab 0     No current facility-administered medications for this visit.         Allergies   Allergen Reactions   • Imitrex [Sumatriptan] Hives   • Pcn [Penicillins] Hives       Vitals:    /69 (BP Location: Left arm, Patient Position: Sitting, BP Cuff Size: Adult)   Pulse (!) 105   Temp 36.8 °C (98.2 °F)   Resp 16   Wt 90 kg (198 lb 6.6 oz)   SpO2 94%  Body mass index is 37.49 kg/m².    Physical Exam:     Physical Exam   Constitutional: She is well-developed, well-nourished, and in no distress. No distress.   HENT:   Head: Normocephalic and atraumatic.   Right Ear: External ear normal.   Left Ear:  External ear normal.   Eyes: Conjunctivae and EOM are normal. Right eye exhibits no discharge. Left eye exhibits no discharge.   Neck: Neck supple. No thyromegaly present.   Cardiovascular: Normal rate, regular rhythm, normal heart sounds and intact distal pulses.   No murmur heard.  Pulmonary/Chest: Effort normal and breath sounds normal. No respiratory distress. She has no wheezes. She has no rales.   Abdominal: Soft. Bowel sounds are normal.   Musculoskeletal:         General: No tenderness, deformity or edema.   Neurological: She is alert. She exhibits normal muscle tone. Gait normal.   Skin: Skin is warm. No rash noted. She is not diaphoretic.   Psychiatric: Mood, affect and judgment normal.        Labs 9/3/2020:  Fasting glucose 111  BUN 11  Creatinine 1.73    Sodium 136  Potassium 4.4  WBC 10.0  Hemoglobin 13.5  Platelet 505  Total cholesterol 228  Triglyceride 321  HDL 45  VLDL 57    A1c 6.1  TSH 2.250  T4 1.02  FSH 2.9, estradiol 241  Vitamin D 22.3    Assessment/Plan:    Beena was seen today for establish care.    Diagnoses and all orders for this visit:    Prediabetes  · All symptoms due to elevated blood glucose.  Discussed with patient about dietary modification and aerobic exercise 150 minutes in a week.  · Start on metformin 500 mg once daily for 1 week then 1 tablet twice daily.  · Discussed with patient about GI side effects of this medication.  · Check A1c in 3 months.    -     HEMOGLOBIN A1C; Future  -     metFORMIN (GLUCOPHAGE) 500 MG Tab; Take 1 tablet by mouth daily for 1 week, then take 1 tablet by mouth two time daily    Mixed hyperlipidemia  · Uncontrolled total cholesterol, triglycerides and LDL level.  · Counseled in detail regarding dietary modification and importance and aerobic exercise.  · Given written instruction regarding healthy diet.    -     Comp Metabolic Panel; Future  -     Lipid Profile; Future    Vitamin D deficiency  · Prescribed vitamin D 50,000  international units daily.  · Check vitamin D level in 3 months.    -     vitamin D, Ergocalciferol, (DRISDOL) 1.25 MG (60890 UT) Cap capsule; Take 1 Cap by mouth every 7 days.  -     VITAMIN D,25 HYDROXY; Future    Seasonal allergies  · Controlled with current regimen.  · Continue same medication regimen.    Recurrent UTI:  · Continue probiotics.  · Advised to follow up with gyn.      Class 2 obesity due to excess calories without serious comorbidity with body mass index (BMI) of 37.0 to 37.9 in adult:  · Advised patient to eat healthy diet: Reduce saturated fat intake, excessive carbohydrate intake in diet, eat more fruits and vegetables, drink at least 2 L of water in a day.  Given healthier eating choices instructions to patient.  Advised to start exercise regimen 20 to 30 minutes, 4-5 times in a week.    -     REFERRAL TO [facility] (HIP) Services Requested: Physician Medical Weight Management Program, Registered Dietitian for Medical Nutrition Therapy; Reason for Referral? BMI>30; Reason for Visit: Overweight/Obesity, BMI of 25 ...    Need for vaccination  · Tdap vaccination given today.    -     Tdap =>8yo IM         Please note that this dictation was created using voice recognition software. I have made every reasonable attempt to correct obvious errors, but I expect that there are errors of grammar and possibly content that I did not discover before finalizing the note.    Follow up in 3 months for follow

## 2020-09-10 ENCOUNTER — OFFICE VISIT (OUTPATIENT)
Dept: MEDICAL GROUP | Facility: MEDICAL CENTER | Age: 40
End: 2020-09-10
Payer: COMMERCIAL

## 2020-09-10 ENCOUNTER — TELEPHONE (OUTPATIENT)
Dept: MEDICAL GROUP | Facility: MEDICAL CENTER | Age: 40
End: 2020-09-10

## 2020-09-10 VITALS
HEART RATE: 99 BPM | OXYGEN SATURATION: 94 % | DIASTOLIC BLOOD PRESSURE: 69 MMHG | WEIGHT: 198.41 LBS | SYSTOLIC BLOOD PRESSURE: 133 MMHG | TEMPERATURE: 98.2 F | BODY MASS INDEX: 37.49 KG/M2 | RESPIRATION RATE: 16 BRPM

## 2020-09-10 DIAGNOSIS — J30.2 SEASONAL ALLERGIES: ICD-10-CM

## 2020-09-10 DIAGNOSIS — Z23 NEED FOR VACCINATION: ICD-10-CM

## 2020-09-10 DIAGNOSIS — E55.9 VITAMIN D DEFICIENCY: ICD-10-CM

## 2020-09-10 DIAGNOSIS — E78.2 MIXED HYPERLIPIDEMIA: ICD-10-CM

## 2020-09-10 DIAGNOSIS — E66.09 CLASS 2 OBESITY DUE TO EXCESS CALORIES WITHOUT SERIOUS COMORBIDITY WITH BODY MASS INDEX (BMI) OF 37.0 TO 37.9 IN ADULT: ICD-10-CM

## 2020-09-10 DIAGNOSIS — N39.0 RECURRENT UTI: ICD-10-CM

## 2020-09-10 DIAGNOSIS — R73.03 PREDIABETES: ICD-10-CM

## 2020-09-10 PROCEDURE — 90471 IMMUNIZATION ADMIN: CPT | Performed by: FAMILY MEDICINE

## 2020-09-10 PROCEDURE — 90715 TDAP VACCINE 7 YRS/> IM: CPT | Performed by: FAMILY MEDICINE

## 2020-09-10 PROCEDURE — 99214 OFFICE O/P EST MOD 30 MIN: CPT | Mod: 25 | Performed by: FAMILY MEDICINE

## 2020-09-10 RX ORDER — LORATADINE 10 MG/1
10 TABLET ORAL DAILY
COMMUNITY
End: 2020-12-23

## 2020-09-10 RX ORDER — ERGOCALCIFEROL 1.25 MG/1
50000 CAPSULE ORAL
Qty: 12 CAP | Refills: 1 | Status: SHIPPED | OUTPATIENT
Start: 2020-09-10 | End: 2021-06-29 | Stop reason: SDUPTHER

## 2020-09-10 ASSESSMENT — ENCOUNTER SYMPTOMS
COUGH: 0
WEIGHT LOSS: 0
EYE REDNESS: 0
EYE PAIN: 0
ABDOMINAL PAIN: 0
WHEEZING: 0
MYALGIAS: 0
PALPITATIONS: 0
DEPRESSION: 0
NERVOUS/ANXIOUS: 0
SHORTNESS OF BREATH: 0
CHILLS: 0
FOCAL WEAKNESS: 0
VOMITING: 0
FEVER: 0
DIZZINESS: 0
SENSORY CHANGE: 0
SINUS PAIN: 0
EYE DISCHARGE: 0
NAUSEA: 1
DIARRHEA: 0
CONSTIPATION: 0
HEMOPTYSIS: 0
SPUTUM PRODUCTION: 0
HEADACHES: 0

## 2020-09-10 ASSESSMENT — PATIENT HEALTH QUESTIONNAIRE - PHQ9: CLINICAL INTERPRETATION OF PHQ2 SCORE: 0

## 2020-09-10 ASSESSMENT — LIFESTYLE VARIABLES: SUBSTANCE_ABUSE: 0

## 2020-09-10 NOTE — ASSESSMENT & PLAN NOTE
This is a new problem for this patient.  Her recent blood work showed elevated total cholesterol at 228, triglyceride at 321 and LDL at 126, HDL 45.  She reports that her eating habits are not good.  She reports that she has teenagers at home who eat a lot of junk food.  She does not exercise.

## 2020-09-10 NOTE — TELEPHONE ENCOUNTER
Phone Number Called: [phone]    Message: called [facility] spoke with timur requested most recent lab report for pt to be faxed to office.

## 2020-09-10 NOTE — ASSESSMENT & PLAN NOTE
This is a chronic problem for this patient.  She follows with OB/GYN for recurrent UTIs.  She reports that she had uterine ablation procedure previously.  She reports that she gets UTIs 3-4 times in a year.

## 2020-09-10 NOTE — ASSESSMENT & PLAN NOTE
This is a chronic problem for this patient.  Patient reports that she recently gained around 15 pounds.  She reports that she does not eat healthy diet and she does not exercise.  She reports that her friend took some weight loss medication and she lost around 40 pounds.  She would like to talk to nutritionist and about medications for weight loss.

## 2020-09-10 NOTE — PATIENT INSTRUCTIONS

## 2020-09-10 NOTE — ASSESSMENT & PLAN NOTE
This is a new problem for this patient.  Her recent blood work showed vitamin D 22.3.  She is not taking any vitamin D supplementation.

## 2020-09-10 NOTE — ASSESSMENT & PLAN NOTE
This is a new problem for this patient.  Patient reports that she has not been feeling well since July.  She reports that she feels lot tired, drinking a lot of water, waking up at nighttime for urination, nausea, hot flashes.  She reports that she went to her previous primary care physician and had blood work done.  [facility], we requested labs and her labs showed A1c 6.1.  Her fasting glucose came back 111.  She is currently not on any medication for prediabetes.

## 2020-10-16 ENCOUNTER — NURSE TRIAGE (OUTPATIENT)
Dept: HEALTH INFORMATION MANAGEMENT | Facility: OTHER | Age: 40
End: 2020-10-16

## 2020-10-16 NOTE — TELEPHONE ENCOUNTER
"Beena  [phone]     Patient is super thirsty and has a headache after working today.  She could not remember how to take the tab off the ice tea.    Patient states something feels wrong and confused. She did have nausea at lunch time and some dizziness.    Once patient laid down she feels better. She is keeping her eyes closed.    Reason for Disposition  • Patient sounds very sick or weak to the triager    Additional Information  • Negative: Difficult to awaken or acting confused (e.g., disoriented, slurred speech)  • Negative: Weakness of the face, arm or leg on one side of the body and new onset  • Negative: Numbness of the face, arm or leg on one side of the body and new onset  • Negative: Loss of speech or garbled speech and new onset  • Negative: Passed out (i.e., fainted, collapsed and was not responding)  • Negative: Sounds like a life-threatening emergency to the triager  • Negative: Followed a head injury within last 3 days  • Negative: Traumatic Brain Injury (TBI) is suspected  • Negative: Sinus pain of forehead and yellow or green nasal discharge  • Negative: Pregnant  • Negative: Unable to walk without falling  • Negative: Stiff neck (can't touch chin to chest)  • Negative: Possibility of carbon monoxide exposure  • Negative: Loss of vision or double vision  • Negative: Severe pain in one eye  • Negative: SEVERE headache, sudden onset (i.e., reaching maximum intensity within 30 seconds)  • Negative: SEVERE headache, states 'worst headache' of life    Answer Assessment - Initial Assessment Questions  1. LOCATION: \"Where does it hurt?\"       Mostly on the RIGHT side  2. ONSET: \"When did the headache start?\" (Minutes, hours or days)       Started about hour ago  3. PATTERN: \"Does the pain come and go, or has it been constant since it started?\"      Came on suddenly  4. SEVERITY: \"How bad is the pain?\" and \"What does it keep you from doing?\"  (e.g., Scale 1-10; mild, moderate, or severe)    - MILD " "(1-3): doesn't interfere with normal activities     - MODERATE (4-7): interferes with normal activities or awakens from sleep     - SEVERE (8-10): excruciating pain, unable to do any normal activities         6/10  5. RECURRENT SYMPTOM: \"Have you ever had headaches before?\" If so, ask: \"When was the last time?\" and \"What happened that time?\"     Yes but never confusion or loss of words  6. CAUSE: \"What do you think is causing the headache?\"      Maybe migraine  7. MIGRAINE: \"Have you been diagnosed with migraine headaches?\" If so, ask: \"Is this headache similar?\"       yes  8. HEAD INJURY: \"Has there been any recent injury to the head?\"       no  9. OTHER SYMPTOMS: \"Do you have any other symptoms?\" (fever, stiff neck, eye pain, sore throat, cold symptoms)     Nausea at lunch time  10. PREGNANCY: \"Is there any chance you are pregnant?\" \"When was your last menstrual period?\"        No    Protocols used: HEADACHE-A-OH      "

## 2020-10-17 NOTE — TELEPHONE ENCOUNTER
Called patient and talked with her.  Patient reports that she was having severe headache with nausea with some word finding difficulty and confusion.  She reports that she is feeling a lot better now.    I advised patient to go to ER or urgent care if she is not feeling better. discussed with patient that the symptoms she is having like confusion or word finding difficulty could be stroke.  Patient reports understanding.

## 2020-12-15 ENCOUNTER — OFFICE VISIT (OUTPATIENT)
Dept: MEDICAL GROUP | Facility: MEDICAL CENTER | Age: 40
End: 2020-12-15
Payer: COMMERCIAL

## 2020-12-15 VITALS
TEMPERATURE: 97.7 F | HEIGHT: 60 IN | DIASTOLIC BLOOD PRESSURE: 65 MMHG | HEART RATE: 102 BPM | WEIGHT: 198.41 LBS | RESPIRATION RATE: 14 BRPM | SYSTOLIC BLOOD PRESSURE: 98 MMHG | OXYGEN SATURATION: 94 % | BODY MASS INDEX: 38.95 KG/M2

## 2020-12-15 DIAGNOSIS — R73.03 PREDIABETES: ICD-10-CM

## 2020-12-15 DIAGNOSIS — R00.0 TACHYCARDIA: ICD-10-CM

## 2020-12-15 DIAGNOSIS — M79.661 RIGHT CALF PAIN: ICD-10-CM

## 2020-12-15 PROCEDURE — 99214 OFFICE O/P EST MOD 30 MIN: CPT | Performed by: FAMILY MEDICINE

## 2020-12-15 RX ORDER — METFORMIN HYDROCHLORIDE 500 MG/1
500 TABLET, EXTENDED RELEASE ORAL 2 TIMES DAILY
Qty: 180 TAB | Refills: 1 | Status: SHIPPED | OUTPATIENT
Start: 2020-12-15 | End: 2021-06-25 | Stop reason: SDUPTHER

## 2020-12-15 ASSESSMENT — ENCOUNTER SYMPTOMS
HEADACHES: 0
SENSORY CHANGE: 0
FEVER: 0
DEPRESSION: 0
WHEEZING: 0
CHILLS: 0
NERVOUS/ANXIOUS: 0
VOMITING: 0
BLOOD IN STOOL: 0
SHORTNESS OF BREATH: 0
DIARRHEA: 0
NAUSEA: 0
HEMOPTYSIS: 0
CONSTIPATION: 0
ABDOMINAL PAIN: 0
COUGH: 0
PALPITATIONS: 0
FOCAL WEAKNESS: 0
DIZZINESS: 0

## 2020-12-15 NOTE — PROGRESS NOTES
FAMILY MEDICINE VISIT                                                               Chief complaint::Diagnoses of Prediabetes, Right calf pain, and Tachycardia were pertinent to this visit.    History of present illness: Beena Wilburn is a 40 y.o. female who presented for right calf pain, medication changes.    Patient reports that she is having right calf pain since Sunday.  She reports that she feels tightness in her right cough.  She denies any trauma to her leg.  She denies any recent long travel.  She is not on any birth control.  She denies any redness or swelling at right calf.    She has history of prediabetes.  Last visit we started her on Metformin 500 mg twice daily.  She reports having excessive gas production with Metformin and want to change the medication.    Patient's reports that her heart rate has been always elevated.  Had not had any previous work-up done.  Denies any chest pain, palpitations, shortness of breath, lower extremity swelling.  Denies any previous cardiac history.    Review of systems:     Review of Systems   Constitutional: Negative for chills, fever and malaise/fatigue.   Respiratory: Negative for cough, hemoptysis, shortness of breath and wheezing.    Cardiovascular: Negative for chest pain, palpitations and leg swelling.   Gastrointestinal: Negative for abdominal pain, blood in stool, constipation, diarrhea, nausea and vomiting.   Musculoskeletal:        Right calf pain   Neurological: Negative for dizziness, sensory change, focal weakness and headaches.   Psychiatric/Behavioral: Negative for depression and suicidal ideas. The patient is not nervous/anxious.         Past Medical, Surgical and Family History:    Past Medical History:   Diagnosis Date   • Arthritis     hips   • Heart burn    • Migraine    • Other specified symptom associated with female genital organs     ovarian cysts     Past Surgical History:   Procedure Laterality Date   • PELVISCOPY N/A 7/11/2018     Procedure: PELVISCOPY;  Surgeon: Enrrique Gale M.D.;  Location: SURGERY SAME DAY [facility];  Service: Gynecology   • SALPINGECTOMY Bilateral 7/11/2018    Procedure: SALPINGECTOMY;  Surgeon: Enrrique Gale M.D.;  Location: SURGERY SAME DAY [facility];  Service: Gynecology   • HYSTEROSCOPY NOVASURE-2  9/2/2009    Performed by ENRRIQUE GALE at SURGERY SAME DAY [facility]   • EYE SURGERY      Due to diplopia   • OTHER      Uterine ablation and tubal ligation   • PRIMARY C SECTION  2003,2007     Family History   Problem Relation Age of Onset   • Cancer Paternal Aunt         Breast cancer   • Cancer Paternal Aunt         Breast cancer   • No Known Problems Mother    • Cancer Maternal Aunt         Cervical cancer        Social History:    Social History     Tobacco Use   • Smoking status: Never Smoker   • Smokeless tobacco: Never Used   Substance Use Topics   • Alcohol use: Yes     Comment: occ   • Drug use: No        Medications and Allergies:     Current Outpatient Medications   Medication Sig Dispense Refill   • metFORMIN ER (GLUCOPHAGE XR) 500 MG TABLET SR 24 HR Take 1 Tab by mouth 2 times a day. 180 Tab 1   • loratadine (CLARITIN) 10 MG Tab Take 10 mg by mouth every day.     • vitamin D, Ergocalciferol, (DRISDOL) 1.25 MG (61773 UT) Cap capsule Take 1 Cap by mouth every 7 days. 12 Cap 1     No current facility-administered medications for this visit.         Allergies   Allergen Reactions   • Imitrex [Sumatriptan] Hives   • Pcn [Penicillins] Hives       Vitals:    BP (!) 98/65 (BP Location: Left arm, Patient Position: Sitting, BP Cuff Size: Adult)   Pulse (!) 102   Temp 36.5 °C (97.7 °F)   Resp 14   Ht 1.524 m (5')   Wt 90 kg (198 lb 6.6 oz)   SpO2 94%  Body mass index is 38.75 kg/m².    Physical Exam:     Physical Exam   Constitutional: She is well-developed, well-nourished, and in no distress. No distress.   HENT:   Head: Normocephalic and atraumatic.   Eyes: Conjunctivae are normal.   Neck: Neck  supple.   Cardiovascular: Normal rate, regular rhythm and normal heart sounds. Exam reveals no gallop and no friction rub.   No murmur heard.  Pulmonary/Chest: Effort normal and breath sounds normal. No respiratory distress. She has no wheezes. She has no rales.   Musculoskeletal:         General: No deformity or edema.      Comments: Right calf tenderness on palpation, no swelling and redness.   Neurological: She is alert. Gait normal.   Psychiatric: Mood, affect and judgment normal.        Assessment/Plan:    Diagnoses and all orders for this visit:    Prediabetes  · Change Metformin to extended release form twice daily due to side effects with other Metformin..    -     metFORMIN ER (GLUCOPHAGE XR) 500 MG TABLET SR 24 HR; Take 1 Tab by mouth 2 times a day.    Right calf pain  · Check ultrasound venous lower extremity to rule out DVT.  · If negative, could be musculoskeletal pain.    -     US-EXTREMITY VENOUS LOWER UNILAT RIGHT; Future    Tachycardia  · Asymptomatic, check other lab work along with thyroid function test.  Check EKG.    -     TSH WITH REFLEX TO FT4; Future  -     EKG - Cardiology Performed; Future         Please note that this dictation was created using voice recognition software. I have made every reasonable attempt to correct obvious errors, but I expect that there are errors of grammar and possibly content that I did not discover before finalizing the note.    Follow up in 4 weeks for lab follow-up.

## 2020-12-17 ENCOUNTER — TELEPHONE (OUTPATIENT)
Dept: MEDICAL GROUP | Facility: MEDICAL CENTER | Age: 40
End: 2020-12-17

## 2020-12-18 NOTE — TELEPHONE ENCOUNTER
Caller Name: Beena Heriberto Johnson  Call Back Number: [phone] (home)     Pt called said she needs to call her ins to see how much the EKG and US of leg will be but needs cpt codes.     Pt had US done. Has not done EKG, gave cpt code for EKG

## 2020-12-23 ENCOUNTER — ANESTHESIA EVENT (OUTPATIENT)
Dept: SURGERY | Facility: MEDICAL CENTER | Age: 40
End: 2020-12-23
Payer: COMMERCIAL

## 2020-12-23 ENCOUNTER — HOSPITAL ENCOUNTER (OUTPATIENT)
Facility: MEDICAL CENTER | Age: 40
End: 2020-12-23
Attending: EMERGENCY MEDICINE | Admitting: SURGERY
Payer: COMMERCIAL

## 2020-12-23 ENCOUNTER — ANESTHESIA (OUTPATIENT)
Dept: SURGERY | Facility: MEDICAL CENTER | Age: 40
End: 2020-12-23
Payer: COMMERCIAL

## 2020-12-23 ENCOUNTER — APPOINTMENT (OUTPATIENT)
Dept: RADIOLOGY | Facility: MEDICAL CENTER | Age: 40
End: 2020-12-23
Attending: EMERGENCY MEDICINE
Payer: COMMERCIAL

## 2020-12-23 VITALS
SYSTOLIC BLOOD PRESSURE: 130 MMHG | OXYGEN SATURATION: 95 % | WEIGHT: 199.3 LBS | BODY MASS INDEX: 39.13 KG/M2 | DIASTOLIC BLOOD PRESSURE: 69 MMHG | TEMPERATURE: 97.9 F | RESPIRATION RATE: 18 BRPM | HEIGHT: 60 IN | HEART RATE: 87 BPM

## 2020-12-23 DIAGNOSIS — G89.18 POSTOPERATIVE PAIN: ICD-10-CM

## 2020-12-23 DIAGNOSIS — K80.50 BILIARY COLIC: ICD-10-CM

## 2020-12-23 LAB
ALBUMIN SERPL BCP-MCNC: 3.9 G/DL (ref 3.2–4.9)
ALBUMIN/GLOB SERPL: 1.2 G/DL
ALP SERPL-CCNC: 116 U/L (ref 30–99)
ALT SERPL-CCNC: 21 U/L (ref 2–50)
ANION GAP SERPL CALC-SCNC: 16 MMOL/L (ref 7–16)
AST SERPL-CCNC: 23 U/L (ref 12–45)
BASOPHILS # BLD AUTO: 0.6 % (ref 0–1.8)
BASOPHILS # BLD: 0.09 K/UL (ref 0–0.12)
BILIRUB SERPL-MCNC: 0.2 MG/DL (ref 0.1–1.5)
BUN SERPL-MCNC: 9 MG/DL (ref 8–22)
CALCIUM SERPL-MCNC: 9.6 MG/DL (ref 8.4–10.2)
CHLORIDE SERPL-SCNC: 96 MMOL/L (ref 96–112)
CO2 SERPL-SCNC: 21 MMOL/L (ref 20–33)
COVID ORDER STATUS COVID19: NORMAL
COVID ORDER STATUS COVID19: NORMAL
CREAT SERPL-MCNC: 0.75 MG/DL (ref 0.5–1.4)
EKG IMPRESSION: NORMAL
EOSINOPHIL # BLD AUTO: 0.07 K/UL (ref 0–0.51)
EOSINOPHIL NFR BLD: 0.5 % (ref 0–6.9)
ERYTHROCYTE [DISTWIDTH] IN BLOOD BY AUTOMATED COUNT: 40.1 FL (ref 35.9–50)
GLOBULIN SER CALC-MCNC: 3.2 G/DL (ref 1.9–3.5)
GLUCOSE SERPL-MCNC: 138 MG/DL (ref 65–99)
HCG SERPL QL: NEGATIVE
HCT VFR BLD AUTO: 39 % (ref 37–47)
HGB BLD-MCNC: 13.2 G/DL (ref 12–16)
IMM GRANULOCYTES # BLD AUTO: 0.05 K/UL (ref 0–0.11)
IMM GRANULOCYTES NFR BLD AUTO: 0.4 % (ref 0–0.9)
LIPASE SERPL-CCNC: 22 U/L (ref 7–58)
LYMPHOCYTES # BLD AUTO: 4.12 K/UL (ref 1–4.8)
LYMPHOCYTES NFR BLD: 29.7 % (ref 22–41)
MCH RBC QN AUTO: 30.9 PG (ref 27–33)
MCHC RBC AUTO-ENTMCNC: 33.8 G/DL (ref 33.6–35)
MCV RBC AUTO: 91.3 FL (ref 81.4–97.8)
MONOCYTES # BLD AUTO: 0.63 K/UL (ref 0–0.85)
MONOCYTES NFR BLD AUTO: 4.5 % (ref 0–13.4)
NEUTROPHILS # BLD AUTO: 8.92 K/UL (ref 2–7.15)
NEUTROPHILS NFR BLD: 64.3 % (ref 44–72)
NRBC # BLD AUTO: 0 K/UL
NRBC BLD-RTO: 0 /100 WBC
PATHOLOGY CONSULT NOTE: NORMAL
PLATELET # BLD AUTO: 441 K/UL (ref 164–446)
PMV BLD AUTO: 9.7 FL (ref 9–12.9)
POTASSIUM SERPL-SCNC: 3.6 MMOL/L (ref 3.6–5.5)
PROT SERPL-MCNC: 7.1 G/DL (ref 6–8.2)
RBC # BLD AUTO: 4.27 M/UL (ref 4.2–5.4)
SARS-COV+SARS-COV-2 AG RESP QL IA.RAPID: NOTDETECTED
SODIUM SERPL-SCNC: 133 MMOL/L (ref 135–145)
SPECIMEN SOURCE: NORMAL
TROPONIN T SERPL-MCNC: <6 NG/L (ref 6–19)
WBC # BLD AUTO: 13.9 K/UL (ref 4.8–10.8)

## 2020-12-23 PROCEDURE — 96365 THER/PROPH/DIAG IV INF INIT: CPT

## 2020-12-23 PROCEDURE — 96367 TX/PROPH/DG ADDL SEQ IV INF: CPT

## 2020-12-23 PROCEDURE — 501583 HCHG TROCAR, THRD CAN&SEAL 5X100: Performed by: SURGERY

## 2020-12-23 PROCEDURE — 160035 HCHG PACU - 1ST 60 MINS PHASE I: Performed by: SURGERY

## 2020-12-23 PROCEDURE — 501838 HCHG SUTURE GENERAL: Performed by: SURGERY

## 2020-12-23 PROCEDURE — 160048 HCHG OR STATISTICAL LEVEL 1-5: Performed by: SURGERY

## 2020-12-23 PROCEDURE — 71045 X-RAY EXAM CHEST 1 VIEW: CPT

## 2020-12-23 PROCEDURE — 500179 HCHG CATH, CHOLANGIOGRAM: Performed by: SURGERY

## 2020-12-23 PROCEDURE — 501572 HCHG TROCAR, SHIELD OBTU 5X100: Performed by: SURGERY

## 2020-12-23 PROCEDURE — 80053 COMPREHEN METABOLIC PANEL: CPT

## 2020-12-23 PROCEDURE — A9270 NON-COVERED ITEM OR SERVICE: HCPCS | Performed by: ANESTHESIOLOGY

## 2020-12-23 PROCEDURE — 76705 ECHO EXAM OF ABDOMEN: CPT

## 2020-12-23 PROCEDURE — 84484 ASSAY OF TROPONIN QUANT: CPT

## 2020-12-23 PROCEDURE — 500514 HCHG ENDOCLIP: Performed by: SURGERY

## 2020-12-23 PROCEDURE — 700101 HCHG RX REV CODE 250: Performed by: SURGERY

## 2020-12-23 PROCEDURE — 88304 TISSUE EXAM BY PATHOLOGIST: CPT

## 2020-12-23 PROCEDURE — 160025 RECOVERY II MINUTES (STATS): Performed by: SURGERY

## 2020-12-23 PROCEDURE — 700101 HCHG RX REV CODE 250: Performed by: EMERGENCY MEDICINE

## 2020-12-23 PROCEDURE — 160002 HCHG RECOVERY MINUTES (STAT): Performed by: SURGERY

## 2020-12-23 PROCEDURE — 96375 TX/PRO/DX INJ NEW DRUG ADDON: CPT

## 2020-12-23 PROCEDURE — 501570 HCHG TROCAR, SEPARATOR: Performed by: SURGERY

## 2020-12-23 PROCEDURE — 700105 HCHG RX REV CODE 258: Performed by: SURGERY

## 2020-12-23 PROCEDURE — 99291 CRITICAL CARE FIRST HOUR: CPT

## 2020-12-23 PROCEDURE — 93005 ELECTROCARDIOGRAM TRACING: CPT | Performed by: EMERGENCY MEDICINE

## 2020-12-23 PROCEDURE — 700111 HCHG RX REV CODE 636 W/ 250 OVERRIDE (IP): Performed by: ANESTHESIOLOGY

## 2020-12-23 PROCEDURE — 160009 HCHG ANES TIME/MIN: Performed by: SURGERY

## 2020-12-23 PROCEDURE — 160041 HCHG SURGERY MINUTES - EA ADDL 1 MIN LEVEL 4: Performed by: SURGERY

## 2020-12-23 PROCEDURE — 83690 ASSAY OF LIPASE: CPT

## 2020-12-23 PROCEDURE — 501568 HCHG TROCAR, BLUNTPORT 12MM: Performed by: SURGERY

## 2020-12-23 PROCEDURE — 93005 ELECTROCARDIOGRAM TRACING: CPT

## 2020-12-23 PROCEDURE — 700105 HCHG RX REV CODE 258: Performed by: EMERGENCY MEDICINE

## 2020-12-23 PROCEDURE — 700101 HCHG RX REV CODE 250: Performed by: ANESTHESIOLOGY

## 2020-12-23 PROCEDURE — 36415 COLL VENOUS BLD VENIPUNCTURE: CPT

## 2020-12-23 PROCEDURE — C9803 HOPD COVID-19 SPEC COLLECT: HCPCS | Performed by: EMERGENCY MEDICINE

## 2020-12-23 PROCEDURE — A9270 NON-COVERED ITEM OR SERVICE: HCPCS | Performed by: SURGERY

## 2020-12-23 PROCEDURE — 700111 HCHG RX REV CODE 636 W/ 250 OVERRIDE (IP): Performed by: EMERGENCY MEDICINE

## 2020-12-23 PROCEDURE — 700102 HCHG RX REV CODE 250 W/ 637 OVERRIDE(OP): Performed by: ANESTHESIOLOGY

## 2020-12-23 PROCEDURE — 700102 HCHG RX REV CODE 250 W/ 637 OVERRIDE(OP): Performed by: SURGERY

## 2020-12-23 PROCEDURE — 160046 HCHG PACU - 1ST 60 MINS PHASE II: Performed by: SURGERY

## 2020-12-23 PROCEDURE — 84703 CHORIONIC GONADOTROPIN ASSAY: CPT

## 2020-12-23 PROCEDURE — 160029 HCHG SURGERY MINUTES - 1ST 30 MINS LEVEL 4: Performed by: SURGERY

## 2020-12-23 PROCEDURE — 87426 SARSCOV CORONAVIRUS AG IA: CPT

## 2020-12-23 PROCEDURE — 500002 HCHG ADHESIVE, DERMABOND: Performed by: SURGERY

## 2020-12-23 PROCEDURE — 502571 HCHG PACK, LAP CHOLE: Performed by: SURGERY

## 2020-12-23 PROCEDURE — 85025 COMPLETE CBC W/AUTO DIFF WBC: CPT

## 2020-12-23 RX ORDER — MORPHINE SULFATE 4 MG/ML
4 INJECTION, SOLUTION INTRAMUSCULAR; INTRAVENOUS ONCE
Status: COMPLETED | OUTPATIENT
Start: 2020-12-23 | End: 2020-12-23

## 2020-12-23 RX ORDER — BUPIVACAINE HYDROCHLORIDE AND EPINEPHRINE 5; 5 MG/ML; UG/ML
INJECTION, SOLUTION EPIDURAL; INTRACAUDAL; PERINEURAL
Status: DISCONTINUED | OUTPATIENT
Start: 2020-12-23 | End: 2020-12-23 | Stop reason: HOSPADM

## 2020-12-23 RX ORDER — ACETAMINOPHEN 325 MG/1
650 TABLET ORAL EVERY 6 HOURS
Qty: 60 TAB | Refills: 0 | Status: SHIPPED | OUTPATIENT
Start: 2020-12-23 | End: 2021-06-29

## 2020-12-23 RX ORDER — CELECOXIB 200 MG/1
200 CAPSULE ORAL ONCE
Status: COMPLETED | OUTPATIENT
Start: 2020-12-23 | End: 2020-12-23

## 2020-12-23 RX ORDER — KETOROLAC TROMETHAMINE 30 MG/ML
INJECTION, SOLUTION INTRAMUSCULAR; INTRAVENOUS PRN
Status: DISCONTINUED | OUTPATIENT
Start: 2020-12-23 | End: 2020-12-23 | Stop reason: SURG

## 2020-12-23 RX ORDER — LABETALOL HYDROCHLORIDE 5 MG/ML
5 INJECTION, SOLUTION INTRAVENOUS
Status: DISCONTINUED | OUTPATIENT
Start: 2020-12-23 | End: 2020-12-24 | Stop reason: HOSPADM

## 2020-12-23 RX ORDER — ROCURONIUM BROMIDE 10 MG/ML
INJECTION, SOLUTION INTRAVENOUS PRN
Status: DISCONTINUED | OUTPATIENT
Start: 2020-12-23 | End: 2020-12-23 | Stop reason: SURG

## 2020-12-23 RX ORDER — ACETAMINOPHEN 500 MG
1000 TABLET ORAL ONCE
Status: COMPLETED | OUTPATIENT
Start: 2020-12-23 | End: 2020-12-23

## 2020-12-23 RX ORDER — CEFAZOLIN SODIUM 1 G/3ML
INJECTION, POWDER, FOR SOLUTION INTRAMUSCULAR; INTRAVENOUS PRN
Status: DISCONTINUED | OUTPATIENT
Start: 2020-12-23 | End: 2020-12-23 | Stop reason: SURG

## 2020-12-23 RX ORDER — IBUPROFEN 600 MG/1
600 TABLET ORAL EVERY 6 HOURS
Qty: 45 TAB | Refills: 0 | Status: SHIPPED | OUTPATIENT
Start: 2020-12-23 | End: 2021-06-29

## 2020-12-23 RX ORDER — OXYCODONE HYDROCHLORIDE 5 MG/1
5 TABLET ORAL EVERY 4 HOURS PRN
Qty: 12 TAB | Refills: 0 | Status: SHIPPED | OUTPATIENT
Start: 2020-12-23 | End: 2020-12-26

## 2020-12-23 RX ORDER — OXYCODONE HCL 5 MG/5 ML
5 SOLUTION, ORAL ORAL
Status: COMPLETED | OUTPATIENT
Start: 2020-12-23 | End: 2020-12-23

## 2020-12-23 RX ORDER — GABAPENTIN 300 MG/1
300 CAPSULE ORAL ONCE
Status: COMPLETED | OUTPATIENT
Start: 2020-12-23 | End: 2020-12-23

## 2020-12-23 RX ORDER — DIPHENHYDRAMINE HCL 25 MG
25 TABLET ORAL EVERY 6 HOURS PRN
Status: DISCONTINUED | OUTPATIENT
Start: 2020-12-23 | End: 2020-12-24 | Stop reason: HOSPADM

## 2020-12-23 RX ORDER — SODIUM CHLORIDE, SODIUM LACTATE, POTASSIUM CHLORIDE, CALCIUM CHLORIDE 600; 310; 30; 20 MG/100ML; MG/100ML; MG/100ML; MG/100ML
INJECTION, SOLUTION INTRAVENOUS CONTINUOUS
Status: DISCONTINUED | OUTPATIENT
Start: 2020-12-23 | End: 2020-12-24 | Stop reason: HOSPADM

## 2020-12-23 RX ORDER — ASPIRIN 500 MG
1000 TABLET ORAL PRN
COMMUNITY
End: 2021-06-29

## 2020-12-23 RX ORDER — LORAZEPAM 2 MG/ML
0.5 INJECTION INTRAMUSCULAR
Status: DISCONTINUED | OUTPATIENT
Start: 2020-12-23 | End: 2020-12-24 | Stop reason: HOSPADM

## 2020-12-23 RX ORDER — HALOPERIDOL 5 MG/ML
1 INJECTION INTRAMUSCULAR
Status: DISCONTINUED | OUTPATIENT
Start: 2020-12-23 | End: 2020-12-24 | Stop reason: HOSPADM

## 2020-12-23 RX ORDER — DEXAMETHASONE SODIUM PHOSPHATE 4 MG/ML
INJECTION, SOLUTION INTRA-ARTICULAR; INTRALESIONAL; INTRAMUSCULAR; INTRAVENOUS; SOFT TISSUE PRN
Status: DISCONTINUED | OUTPATIENT
Start: 2020-12-23 | End: 2020-12-23 | Stop reason: SURG

## 2020-12-23 RX ORDER — ONDANSETRON 2 MG/ML
4 INJECTION INTRAMUSCULAR; INTRAVENOUS
Status: DISCONTINUED | OUTPATIENT
Start: 2020-12-23 | End: 2020-12-24 | Stop reason: HOSPADM

## 2020-12-23 RX ORDER — ONDANSETRON 2 MG/ML
INJECTION INTRAMUSCULAR; INTRAVENOUS PRN
Status: DISCONTINUED | OUTPATIENT
Start: 2020-12-23 | End: 2020-12-23 | Stop reason: SURG

## 2020-12-23 RX ORDER — DIPHENHYDRAMINE HYDROCHLORIDE 50 MG/ML
12.5 INJECTION INTRAMUSCULAR; INTRAVENOUS
Status: DISCONTINUED | OUTPATIENT
Start: 2020-12-23 | End: 2020-12-24 | Stop reason: HOSPADM

## 2020-12-23 RX ORDER — ONDANSETRON 2 MG/ML
4 INJECTION INTRAMUSCULAR; INTRAVENOUS ONCE
Status: COMPLETED | OUTPATIENT
Start: 2020-12-23 | End: 2020-12-23

## 2020-12-23 RX ORDER — OXYCODONE HCL 5 MG/5 ML
10 SOLUTION, ORAL ORAL
Status: COMPLETED | OUTPATIENT
Start: 2020-12-23 | End: 2020-12-23

## 2020-12-23 RX ORDER — DIPHENHYDRAMINE HYDROCHLORIDE 50 MG/ML
25 INJECTION INTRAMUSCULAR; INTRAVENOUS EVERY 6 HOURS PRN
Status: DISCONTINUED | OUTPATIENT
Start: 2020-12-23 | End: 2020-12-24 | Stop reason: HOSPADM

## 2020-12-23 RX ORDER — SODIUM CHLORIDE, SODIUM LACTATE, POTASSIUM CHLORIDE, CALCIUM CHLORIDE 600; 310; 30; 20 MG/100ML; MG/100ML; MG/100ML; MG/100ML
1000 INJECTION, SOLUTION INTRAVENOUS ONCE
Status: COMPLETED | OUTPATIENT
Start: 2020-12-23 | End: 2020-12-23

## 2020-12-23 RX ORDER — DOCUSATE SODIUM 100 MG/1
100 CAPSULE, LIQUID FILLED ORAL 2 TIMES DAILY
Qty: 60 CAP | Refills: 0 | Status: SHIPPED | OUTPATIENT
Start: 2020-12-23 | End: 2021-06-29

## 2020-12-23 RX ADMIN — METRONIDAZOLE 500 MG: 500 INJECTION, SOLUTION INTRAVENOUS at 18:10

## 2020-12-23 RX ADMIN — CELECOXIB 200 MG: 200 CAPSULE ORAL at 19:30

## 2020-12-23 RX ADMIN — MORPHINE SULFATE 4 MG: 4 INJECTION INTRAVENOUS at 14:40

## 2020-12-23 RX ADMIN — KETOROLAC TROMETHAMINE 30 MG: 30 INJECTION, SOLUTION INTRAMUSCULAR at 20:55

## 2020-12-23 RX ADMIN — ONDANSETRON 4 MG: 2 INJECTION INTRAMUSCULAR; INTRAVENOUS at 14:40

## 2020-12-23 RX ADMIN — SODIUM CHLORIDE, POTASSIUM CHLORIDE, SODIUM LACTATE AND CALCIUM CHLORIDE 1000 ML: 600; 310; 30; 20 INJECTION, SOLUTION INTRAVENOUS at 16:52

## 2020-12-23 RX ADMIN — GABAPENTIN 300 MG: 300 CAPSULE ORAL at 19:30

## 2020-12-23 RX ADMIN — OXYCODONE HYDROCHLORIDE 10 MG: 5 SOLUTION ORAL at 21:45

## 2020-12-23 RX ADMIN — CEFTRIAXONE SODIUM 2 G: 2 INJECTION, POWDER, FOR SOLUTION INTRAMUSCULAR; INTRAVENOUS at 17:09

## 2020-12-23 RX ADMIN — ACETAMINOPHEN 1000 MG: 500 TABLET, FILM COATED ORAL at 19:30

## 2020-12-23 RX ADMIN — ROCURONIUM BROMIDE 50 MG: 10 INJECTION, SOLUTION INTRAVENOUS at 20:55

## 2020-12-23 RX ADMIN — FENTANYL CITRATE 50 MCG: 50 INJECTION, SOLUTION INTRAMUSCULAR; INTRAVENOUS at 20:55

## 2020-12-23 RX ADMIN — DEXAMETHASONE SODIUM PHOSPHATE 8 MG: 4 INJECTION, SOLUTION INTRAMUSCULAR; INTRAVENOUS at 20:55

## 2020-12-23 RX ADMIN — SODIUM CHLORIDE, POTASSIUM CHLORIDE, SODIUM LACTATE AND CALCIUM CHLORIDE: 600; 310; 30; 20 INJECTION, SOLUTION INTRAVENOUS at 20:55

## 2020-12-23 RX ADMIN — ONDANSETRON 8 MG: 2 INJECTION INTRAMUSCULAR; INTRAVENOUS at 20:55

## 2020-12-23 RX ADMIN — FENTANYL CITRATE 50 MCG: 50 INJECTION, SOLUTION INTRAMUSCULAR; INTRAVENOUS at 21:14

## 2020-12-23 RX ADMIN — PROPOFOL 150 MG: 10 INJECTION, EMULSION INTRAVENOUS at 20:55

## 2020-12-23 RX ADMIN — CEFAZOLIN 2 G: 1 INJECTION, POWDER, FOR SOLUTION INTRAVENOUS at 20:55

## 2020-12-23 ASSESSMENT — PAIN DESCRIPTION - PAIN TYPE
TYPE: SURGICAL PAIN

## 2020-12-23 ASSESSMENT — PAIN SCALES - GENERAL: PAIN_LEVEL: 1

## 2020-12-23 NOTE — ED PROVIDER NOTES
ED Provider Note  CHIEF COMPLAINT  Chief Complaint   Patient presents with   • Chest Pain     x 30 minutes       HPI  Beena Wilburn is a 40 y.o. female who presents to the ER with complaint of a sudden onset of epigastric abdominal pain which radiated up into her chest and around into her back.  She also noted some discomfort in her right shoulder blade and a little bit into her right arm.  She said that she broke into a sweat and started hyperventilating due to the severity of the pain.  She says she has had several children and this pain was worse than anything she never experienced with childbirth.  She reports having nausea and some vomiting with the pain.  She felt a little short of breath, but again she reports she was hyperventilating.  She felt tingling in both of her hands and her hands started cramping up on her and turning into claw hands as she was riding in the car to the hospital.  Pain started less than an hour prior to arrival to the ER.  She has never had pain like this before.  She had wise, cheese and eggs this morning for breakfast.  She was eating a cookie just before onset of her abdominal pain this afternoon.  She has history of  and tubal ligation, otherwise no other abdominal surgeries.  She has history of prediabetes and reports that her LDL was high in September.  Her doctor has not yet placed her on any lipid medication.  Her grandfather had a cardiomyopathy in the 40s due to infection and end up with a heart transplant, otherwise no significant cardiac history.  Patient states that the pain is a bit better now that she is back here in the department.  She described as a 10 out of 10 at its worst.  It is now a 3 out of 10.  No fevers or chills.  No recent cough, cold or flulike symptoms.  She works from home and denies exposure to anybody was tested positive for COVID-19.    REVIEW OF SYSTEMS  See HPI for further details.  Positive for upper abdominal pain, nausea,  vomiting, back pain.  Negative for fever, chills, diarrhea, urinary symptoms, cough, hemoptysis, pain/swelling in legs.  All other systems are negative.    PAST MEDICAL HISTORY  Past Medical History:   Diagnosis Date   • Arthritis     hips   • Heart burn    • Migraine    • Other specified symptom associated with female genital organs     ovarian cysts       FAMILY HISTORY  Family History   Problem Relation Age of Onset   • Cancer Paternal Aunt         Breast cancer   • Cancer Paternal Aunt         Breast cancer   • No Known Problems Mother    • Cancer Maternal Aunt         Cervical cancer       SOCIAL HISTORY  Social History     Socioeconomic History   • Marital status: Single     Spouse name: Not on file   • Number of children: Not on file   • Years of education: Not on file   • Highest education level: Not on file   Occupational History   • Not on file   Social Needs   • Financial resource strain: Not on file   • Food insecurity     Worry: Not on file     Inability: Not on file   • Transportation needs     Medical: Not on file     Non-medical: Not on file   Tobacco Use   • Smoking status: Never Smoker   • Smokeless tobacco: Never Used   Substance and Sexual Activity   • Alcohol use: Yes     Comment: occ   • Drug use: No   • Sexual activity: Yes     Partners: Male     Comment: , Work in IT sales   Lifestyle   • Physical activity     Days per week: Not on file     Minutes per session: Not on file   • Stress: Not on file   Relationships   • Social connections     Talks on phone: Not on file     Gets together: Not on file     Attends Worship service: Not on file     Active member of club or organization: Not on file     Attends meetings of clubs or organizations: Not on file     Relationship status: Not on file   • Intimate partner violence     Fear of current or ex partner: Not on file     Emotionally abused: Not on file     Physically abused: Not on file     Forced sexual activity: Not on file   Other  Topics Concern   • Not on file   Social History Narrative   • Not on file       SURGICAL HISTORY  Past Surgical History:   Procedure Laterality Date   • PELVISCOPY N/A 7/11/2018    Procedure: PELVISCOPY;  Surgeon: Enrrique Gale M.D.;  Location: SURGERY SAME DAY [facility];  Service: Gynecology   • SALPINGECTOMY Bilateral 7/11/2018    Procedure: SALPINGECTOMY;  Surgeon: Enrrique Gale M.D.;  Location: SURGERY SAME DAY [facility];  Service: Gynecology   • HYSTEROSCOPY NOVASURE-2  9/2/2009    Performed by ENRRIQUE GALE at SURGERY SAME DAY [facility]   • EYE SURGERY      Due to diplopia   • OTHER      Uterine ablation and tubal ligation   • PRIMARY C SECTION  2003,2007       CURRENT MEDICATIONS  Home Medications    **Home medications have not yet been reviewed for this encounter**         ALLERGIES  Allergies   Allergen Reactions   • Imitrex [Sumatriptan] Hives   • Pcn [Penicillins] Hives       PHYSICAL EXAM  VITAL SIGNS: /78   Pulse 86   Temp 35.8 °C (96.5 °F) (Temporal)   Resp (!) 31   Ht 1.524 m (5')   Wt 90.4 kg (199 lb 4.7 oz)   SpO2 96%   Breastfeeding No   BMI 38.92 kg/m²      Constitutional: Well developed, well nourished; moderate acute distress; anxious non-toxic appearance.  Obese  HENT: Normocephalic, atraumatic; Bilateral external ears normal; Oropharynx with moist mucous membranes; No erythema or exudates in the posterior oropharynx.   Eyes: PERRL, EOMI, Conjunctiva normal. No discharge.   Neck:  Supple, nontender midline; No stridor; No nuchal rigidity.   Lymphatic: No cervical lymphadenopathy noted.   Cardiovascular: Regular rate and rhythm without murmurs, rubs, or gallop.   Thorax & Lungs: No respiratory distress, breath sounds clear to auscultation bilaterally without wheezing, rales or rhonchi. Nontender chest wall. No crepitus or subcutaneous air  Abdomen: Soft, tender to percussion and palpation in the midepigastrium and right upper quadrant bowel sounds normal. No obvious  masses; No pulsatile masses; no rebound, guarding, or peritoneal signs.   Skin: Good color; warm and dry without rash or petechia.  Back: Nontender, mild bilateral CVA tenderness.   Extremities: Distal radial, dorsalis pedis, posterior tibial pulses are equal bilaterally; No edema; Nontender calves or saphenous, No cyanosis, No clubbing.   Musculoskeletal: Good range of motion in all major joints. No tenderness to palpation or major deformities noted.   Neurologic: Alert & oriented x 4, clear speech    EKG  Please see my EKG interpretation below    RADIOLOGY/PROCEDURES  US-RUQ   Final Result      1.  Contracted gallbladder with gallstones. No evidence of biliary ductal dilatation.      2.  The patient is clinically tender over the gallbladder which can be seen in the clinical setting of acute cholecystitis.      DX-CHEST-PORTABLE (1 VIEW)   Final Result      No evidence of acute cardiopulmonary process.          COURSE & MEDICAL DECISION MAKING  Pertinent Labs & Imaging studies reviewed. (See chart for details)    Results for orders placed or performed during the hospital encounter of 12/23/20   CBC with Differential   Result Value Ref Range    WBC 13.9 (H) 4.8 - 10.8 K/uL    RBC 4.27 4.20 - 5.40 M/uL    Hemoglobin 13.2 12.0 - 16.0 g/dL    Hematocrit 39.0 37.0 - 47.0 %    MCV 91.3 81.4 - 97.8 fL    MCH 30.9 27.0 - 33.0 pg    MCHC 33.8 33.6 - 35.0 g/dL    RDW 40.1 35.9 - 50.0 fL    Platelet Count 441 164 - 446 K/uL    MPV 9.7 9.0 - 12.9 fL    Neutrophils-Polys 64.30 44.00 - 72.00 %    Lymphocytes 29.70 22.00 - 41.00 %    Monocytes 4.50 0.00 - 13.40 %    Eosinophils 0.50 0.00 - 6.90 %    Basophils 0.60 0.00 - 1.80 %    Immature Granulocytes 0.40 0.00 - 0.90 %    Nucleated RBC 0.00 /100 WBC    Neutrophils (Absolute) 8.92 (H) 2.00 - 7.15 K/uL    Lymphs (Absolute) 4.12 1.00 - 4.80 K/uL    Monos (Absolute) 0.63 0.00 - 0.85 K/uL    Eos (Absolute) 0.07 0.00 - 0.51 K/uL    Baso (Absolute) 0.09 0.00 - 0.12 K/uL    Immature  Granulocytes (abs) 0.05 0.00 - 0.11 K/uL    NRBC (Absolute) 0.00 K/uL   Complete Metabolic Panel (CMP)   Result Value Ref Range    Sodium 133 (L) 135 - 145 mmol/L    Potassium 3.6 3.6 - 5.5 mmol/L    Chloride 96 96 - 112 mmol/L    Co2 21 20 - 33 mmol/L    Anion Gap 16.0 7.0 - 16.0    Glucose 138 (H) 65 - 99 mg/dL    Bun 9 8 - 22 mg/dL    Creatinine 0.75 0.50 - 1.40 mg/dL    Calcium 9.6 8.4 - 10.2 mg/dL    AST(SGOT) 23 12 - 45 U/L    ALT(SGPT) 21 2 - 50 U/L    Alkaline Phosphatase 116 (H) 30 - 99 U/L    Total Bilirubin 0.2 0.1 - 1.5 mg/dL    Albumin 3.9 3.2 - 4.9 g/dL    Total Protein 7.1 6.0 - 8.2 g/dL    Globulin 3.2 1.9 - 3.5 g/dL    A-G Ratio 1.2 g/dL   Troponin   Result Value Ref Range    Troponin T <6 6 - 19 ng/L   ESTIMATED GFR   Result Value Ref Range    GFR If African American >60 >60 mL/min/1.73 m 2    GFR If Non African American >60 >60 mL/min/1.73 m 2   LIPASE   Result Value Ref Range    Lipase 22 7 - 58 U/L   HCG QUAL SERUM   Result Value Ref Range    Beta-Hcg Qualitative Serum Negative Negative   COVID/SARS CoV-2 PCR    Specimen: Nasopharyngeal; Respirate   Result Value Ref Range    COVID Order Status Received    COVID/SARS CoV-2 PCR   Result Value Ref Range    COVID Order Status Received    SARS-COV Antigen VISHAL   Result Value Ref Range    SARS-CoV-2 Source NP Swab     SARS-COV ANTIGEN VISHAL NotDetected Not-Detected   EKG   Result Value Ref Range    Report       [facility] Emergency Dept.    Test Date:  2020  Pt Name:    JOSE ZEE               Department: [facility]  MRN:        [id number]                      Room:  Gender:     Female                       Technician: [id number]  :        1980                   Requested By:ER TRIAGE PROTOCOL  Order #:    [id number]                    Lo MD: Heather Roberts    Measurements  Intervals                                Axis  Rate:       78                           P:          52  VA:         152                          QRS:         7  QRSD:       83                           T:          32  QT:         376  QTc:        429    Interpretive Statements  Sinus rhythm rate 78  Normal intervals  Normal axis  No ST elevation or depression  No previous ECG available for comparison  Electronically Signed On 12- 16:47:36 PST by Heather Roberts       Patient presents to the ER complaining of sudden onset of epigastric abdominal pain which radiated around into her back and up into her right shoulder blade.  She also had a little bit of radiation into the right arm.  She said she got sweaty, clammy, had nausea and vomiting.  She was hyperventilating and as she was hyperventilating she had a cramping of her hands and a tingling to her hands bilaterally.  Pain was a 10 out of 10 upon onset.  She eaten a fatty meal of wise, eggs and cheese earlier this morning.  The pain started shortly after eating a cookie.  No history of similar pain in the past.  She was tender in the midepigastrium and right upper quadrant on examination.  Her pain got a little better by the time she arrived here to the ER.  She was no longer hyperventilating and no longer had any carpopedal spasm.  EKG is normal.  No evidence of ST elevation or depression.  Troponin is undetectable.  At this time no concern for STEMI or non-STEMI.  Ultrasound of the gallbladder shows gallstones.  The patient was distinctly tender over the gallbladder when the ultrasonographer was pushing on the gallbladder itself.  Patient has a positive Gant sign here in the ER.  Patient's white count is elevated at 14,000.  LFTs are not terribly elevated.  No common bile duct dilatation on ultrasound.  At this time I do not think she has choledocholithiasis.  However, patient remains tender over the gallbladder.  I am worried she has cholecystitis.  She has no tenderness in the right lower quadrant.  I do not think she has appendicitis.  I spoke with Dr. Ritter, general surgeon on-call.  He says he will take  her to the operating room to do a cholecystectomy.  Patient was given Rocephin and Flagyl here in the ER.  Her Covid test is negative.  At this time further evaluation and management will done by the general surgeon.      1635: Patient is still having some discomfort.  However, it is better than it was on initial evaluation.  She is  in the midepigastrium and right upper quadrant with palpation and percussion.    1645: Discussed with Dr. Ritter, general surgeon on-call.  He will take the patient to the OR for cholecystectomy.  COVID-19 test has been ordered.  Patient will be n.p.o.    I verified that the patient was wearing a mask and I was wearing appropriate PPE every time I entered the room. The patient's mask was on the patient at all times during my encounter       FINAL IMPRESSION  1. Biliary colic Acute    Suspect cholecystitis     This dictation has been created using voice recognition software. The accuracy of the dictation is limited by the abilities of the software. I expect there may be some errors of grammar and possibly content. I made every attempt to manually correct the errors within my dictation. However, errors related to voice recognition software may still exist and should be interpreted within the appropriate context.    Electronically signed by: Heather Roberts M.D., 12/23/2020 1:03 PM

## 2020-12-23 NOTE — ED TRIAGE NOTES
Pt to er with sudden onset of sharp midsternal chest pain approx 30 minutes pta while eating a cookie with radiation to back . ekg in process, pt states improved discomfort with standing/amb. Nausea, no vomiting, bp in both arms charted. spouse states asa 1000mg pta

## 2020-12-23 NOTE — ED NOTES
No old EKG to compare. Pt reports eating breakfast burrito prior to pain beginning. BP repeated in both arms.

## 2020-12-23 NOTE — ED NOTES
"Ekg=no stemi, pt states improved discomfort with standing/amb, denies covid exposure, cardiac hx, states \"pre diabetic' , taking metformin, denies drug use or smoking.  bp l/a=107/77, bp r/a=136/94  "

## 2020-12-24 NOTE — ANESTHESIA POSTPROCEDURE EVALUATION
Patient: Beena Wilburn    Procedure Summary     Date: 12/23/20 Room / Location: [place] [address] / SURGERY [facility]    Anesthesia Start: 2055 Anesthesia Stop:     Procedure: CHOLECYSTECTOMY, LAPAROSCOPIC (N/A Abdomen) Diagnosis: (acute cholecystitis)    Surgeons: Reggie Ritter M.D. Responsible Provider: Tariq Rubi M.D.    Anesthesia Type: general ASA Status: 2 - Emergent          Final Anesthesia Type: general  Last vitals  BP   Blood Pressure: 130/69, NIBP: 134/71    Temp   36.7 °C (98.1 °F)    Pulse   Pulse: 96   Resp   16    SpO2   99 %      Anesthesia Post Evaluation    Patient location during evaluation: PACU  Patient participation: complete - patient participated  Level of consciousness: awake and alert  Pain score: 1    Airway patency: patent  Anesthetic complications: no  Cardiovascular status: hemodynamically stable  Respiratory status: acceptable  Hydration status: euvolemic    PONV: none           Nurse Pain Score: 4 (NPRS)

## 2020-12-24 NOTE — ANESTHESIA PROCEDURE NOTES
Airway    Date/Time: 12/23/2020 8:55 PM  Performed by: Tariq Rubi M.D.  Authorized by: Tariq Rubi M.D.     Location:  OR  Urgency:  Elective  Indications for Airway Management:  Anesthesia      Spontaneous Ventilation: absent    Sedation Level:  Deep  Preoxygenated: Yes    Patient Position:  Sniffing  Mask Difficulty Assessment:  1 - vent by mask  Final Airway Type:  Endotracheal airway  Final Endotracheal Airway:  ETT  Cuffed: Yes    Technique Used for Successful ETT Placement:  Direct laryngoscopy    Insertion Site:  Oral  Blade Type:  Cam  Laryngoscope Blade/Videolaryngoscope Blade Size:  2  ETT Size (mm):  7.5  Measured from:  Teeth  ETT to Teeth (cm):  22  Placement Verified by: auscultation and capnometry    Cormack-Lehane Classification:  Grade IIa - partial view of glottis  Number of Attempts at Approach:  1

## 2020-12-24 NOTE — OP REPORT
Operative Report    Date: 12/23/2020    Surgeon: Reggie Ritter M.D.     Assistant: KESHAWN Zavala    Pre-operative Diagnosis: Acute Cholecystitis    Post-operative Diagnosis: same    ASA Classification: II.E    Procedure: Laparoscopic cholecystectomy    Indications: This is a 40 y.o. female who presented with symptoms of right upper quadrant pain.      Wound Classification: Class II, II, Clean Contaminated..    Findings: Critical view of safety obtained    Procedure in detail: The patient was seen and examined in the preoperative holding area.  The risks benefits and alternatives of the procedure were discussed with the patient who wished to proceed with the procedure as described.  The patient was transferred to the operating room placed in supine position and all pressure points were properly padded.  General endotracheal anesthesia was induced and preoperative antibiotics were given per SCIP protocol.  Patient's abdomen was prepped with ChloraPrep and draped in the normal sterile fashion.  A timeout was performed confirming correct patient, correct procedure, and that all necessary equipment was in the room.      After infiltration of local anesthetic, an incision was made in the supraumbilical position.  A combination of blunt and electrocautery dissection was used down to the fascia.  The umbilical stalk was grasped elevated and the fascia was sharply incised.  A figure-of-eight 0 vicryl suture was placed across the fascial opening.  The Collazo port was introduced and pneumoperitoneum was achieved and maintained at 15 mmHg carbon dioxide throughout the entirety of the case.   The 5 mm camera was introduced and the abdomen was inspected and no underlying trauma was identified from abdominal entry. After infusing local anesthetic under direct visualization two 5 mm right upper quadrant and one 5 mm epigastric port were placed.    The gallbladder was identified in the right upper quadrant.  A combination of  blunt and electrocautery dissection were used to dissect the overlaying tissue free from around the cystic duct and cystic artery.  Dissection was continued until the cystic duct and cystic artery free and there is nothing but liver parenchyma behind.  This confirmed the critical view of safety. The cystic duct was singly clipped on the specimen side and a ductotomy was created.  The cholangiogram catheter was introduced and secured in place.  The cholangiogram demonstrated good filling of the cystic duct, common bile duct, and right and left hepatic ducts.  There is no filling defects identified within the system.  The cholangiogram catheter was removed under direct visualization the cystic duct was double clipped on the patient's side and the cystic artery was singly clipped on the specimen side double clipped on the patient's side and both structures were sharply transected. The gallbladder was removed from the cystic plate using electrocautery.  I inspected the cystic plate and found it to be hemostatic.  The gallbladder was placed in a 10 mm Endo Catch bag through the supraumbilical port and removed.    All ports were removed with video assist, and were hemostatic. The previously placed vicryl suture were tied. All skin sites were closed with subcuticular Monocryl and Dermabond was placed over the wounds.    The patient was awakened from general anesthetic, and was taken to the recovery room in stable condition.    Sponge and needle counts were correct at the end of the case.     Specimen: gallbladder and content for permanent pathology    EBL: 10 mL    Dispo: stable, extubated, to PACU    Reggie Ritter M.D.  [facility]  [phone]

## 2020-12-24 NOTE — OR NURSING
2133 pt to pacu s/p general anes for lap julissa. VSS. Lungs clear, lap stabs x 4 covered with derm bond are cd&i. No oozing observed.  Pt is responsive on admit to unit. Allow to rest prn. 2145 tolerated PO before given orals for c/o sx pain. See MAR.  2218 meets st 2 criteria. Spouse in. DC instr reviewed, all questions answered. Dressed with min. Assist. DC'd in good cond at 2227

## 2020-12-24 NOTE — ANESTHESIA TIME REPORT
Anesthesia Start and Stop Event Times     Date Time Event    12/23/2020 2055 Ready for Procedure     2055 Anesthesia Start        Responsible Staff  12/23/20    Name Role Begin End    Tariq Rubi M.D. Anesth 2055         Preop Diagnosis (Free Text):  Pre-op Diagnosis     acute cholecystitis        Preop Diagnosis (Codes):    Post op Diagnosis  Acute cholecystitis      Premium Reason  A. 3PM - 7AM    Comments: emergency

## 2020-12-24 NOTE — DISCHARGE INSTRUCTIONS
ACTIVITY: Rest and take it easy for the first 24 hours.  A responsible adult is recommended to remain with you during that time.  It is normal to feel sleepy.  We encourage you to not do anything that requires balance, judgment or coordination.    MILD FLU-LIKE SYMPTOMS ARE NORMAL. YOU MAY EXPERIENCE GENERALIZED MUSCLE ACHES, THROAT IRRITATION, HEADACHE AND/OR SOME NAUSEA.    FOR 24 HOURS DO NOT:  Drive, operate machinery or run household appliances.  Drink beer or alcoholic beverages.   Make important decisions or sign legal documents.    SPECIAL INSTRUCTIONS: Splint abdomen when coughing or sneezing. No heavy lifting until cleared by Dr. Ritter.    DIET: To avoid nausea, slowly advance diet as tolerated, avoiding spicy or greasy foods for the first day.  Add more substantial food to your diet according to your physician's instructions. INCREASE FLUIDS AND FIBER TO AVOID CONSTIPATION.    SURGICAL DRESSING/BATHING: Showers only 72 hours post surgery.  No tub baths until cleared by .  May cover surgical sites as needed with bandaids.      FOLLOW-UP APPOINTMENT:  A follow-up appointment should be arranged with your doctor in office as instructed; call to schedule.    You should CALL YOUR PHYSICIAN if you develop:  Fever greater than 101 degrees F.  Pain not relieved by medication, or persistent nausea or vomiting.  Excessive bleeding (blood soaking through dressing) or unexpected drainage from the wound.  Extreme redness or swelling around the incision site, drainage of pus or foul smelling drainage.  Inability to urinate or empty your bladder within 8 hours.  Problems with breathing or chest pain.    You should call 911 if you develop problems with breathing or chest pain.  If you are unable to contact your doctor or surgical center, you should go to the nearest emergency room or urgent care center.  Dr Ritter's telephone #: [phone]    If any questions arise, call your doctor.  If your doctor is not  available, please feel free to call the Surgical Center at [phone]. The Contact Center is open Monday through Friday 7AM to 5PM and may speak to a nurse at [phone], or toll free at [phone].     A registered nurse may call you a few days after your surgery to see how you are doing after your procedure.    MEDICATIONS: Resume taking daily medication.  Take prescribed pain medication with food.  If no medication is prescribed, you may take non-aspirin pain medication if needed.  PAIN MEDICATION CAN BE VERY CONSTIPATING.  Take a stool softener or laxative such as senokot, pericolace, or milk of magnesia if needed.    Prescription given for (given pre op).  Last pain medication given at 9:45 PM  Given oxycodone 10mg.    If your physician has prescribed pain medication that includes Acetaminophen (Tylenol), do not take additional Acetaminophen (Tylenol) while taking the prescribed medication.    Depression / Suicide Risk    As you are discharged from this [facility] facility, it is important to learn how to keep safe from harming yourself.    Recognize the warning signs:  · Abrupt changes in personality, positive or negative- including increase in energy   · Giving away possessions  · Change in eating patterns- significant weight changes-  positive or negative  · Change in sleeping patterns- unable to sleep or sleeping all the time   · Unwillingness or inability to communicate  · Depression  · Unusual sadness, discouragement and loneliness  · Talk of wanting to die  · Neglect of personal appearance   · Rebelliousness- reckless behavior  · Withdrawal from people/activities they love  · Confusion- inability to concentrate     If you or a loved one observes any of these behaviors or has concerns about self-harm, here's what you can do:  · Talk about it- your feelings and reasons for harming yourself  · Remove any means that you might use to hurt yourself (examples: pills, rope, extension cords,  firearm)  · Get professional help from the community (Mental Health, Substance Abuse, psychological counseling)  · Do not be alone:Call your Safe Contact- someone whom you trust who will be there for you.  · Call your local CRISIS HOTLINE 953-2762 or 111-601-4230  · Call your local Children's Mobile Crisis Response Team [place] (123) 772-5905 or www.[employer]  · Call the toll free National Suicide Prevention Hotlines   · National Suicide Prevention Lifeline 598-788-AYYF (5774)  · National Hope Line Network 800-SUICIDE (988-7641)

## 2020-12-24 NOTE — H&P
CHIEF COMPLAINT: right upper quadrant pain     HISTORY OF PRESENT ILLNESS:  The patient is a 40 year-old White woman who presents to the Emergency Department a 1- day history of moderate right upper quadrant  abdominal pain. The pain is associated with nausea. She denies any food intolerance or temporal relationship between symptoms and eating. She admits a family history of gallstones. The patient denies any recent or intercurrent illness. The patient denies any recent or intercurrent illness. The patient denies any history of previous abdominal surgery.    PAST MEDICAL HISTORY:  has a past medical history of Arthritis, Heart burn, Migraine, and Other specified symptom associated with female genital organs.    PAST SURGICAL HISTORY:  has a past surgical history that includes primary c section (2003,2007); hysteroscopy novasure-2 (9/2/2009); pelviscopy (N/A, 7/11/2018); salpingectomy (Bilateral, 7/11/2018); other; and eye surgery.     ALLERGIES:   Allergies   Allergen Reactions   • Imitrex [Sumatriptan] Unspecified     Per pt it cause more pain   • Pcn [Penicillins] Hives     Pt reports when she was an infant she received hives         CURRENT MEDICATIONS:   Home Medications     Reviewed by Elaine Nolan (Pharmacy Tech) on 12/23/20 at 1315  Med List Status: Complete   Medication Last Dose Status   aspirin (NICHOLE ASPIRIN EXTRA STRENGTH) 500 MG tablet 12/23/2020 Active   metFORMIN ER (GLUCOPHAGE XR) 500 MG TABLET SR 24 HR 12/23/2020 Active   Probiotic Product (PROBIOTIC DAILY PO) 12/22/2020 Active   vitamin D, Ergocalciferol, (DRISDOL) 1.25 MG (43557 UT) Cap capsule Not Taking Active                FAMILY HISTORY:   Family History   Problem Relation Age of Onset   • Cancer Paternal Aunt         Breast cancer   • Cancer Paternal Aunt         Breast cancer   • No Known Problems Mother    • Cancer Maternal Aunt         Cervical cancer       SOCIAL HISTORY:   Social History     Tobacco Use   • Smoking status:  Never Smoker   • Smokeless tobacco: Never Used   Substance and Sexual Activity   • Alcohol use: Yes     Comment: occ   • Drug use: No   • Sexual activity: Yes     Partners: Male     Comment: , Work in IT sales       REVIEW OF SYSTEMS: Comprehensive review of systems is negative with the exception of the aforementioned HPI, PMH, and PSH bullets in accordance with CMS guidelines.     PHYSICAL EXAMINATION:   CONSTITUTIONAL:     Vital Signs: BP (!) 93/65   Pulse 97   Temp 35.8 °C (96.5 °F) (Temporal)   Resp 19   Ht 1.524 m (5')   Wt 90.4 kg (199 lb 4.7 oz)   SpO2 96%    General Appearance: appears stated age.  HEAD AND NECK: The pupils are equal, round, and reactive to light bilaterally. The extraocular muscles are intact bilaterally.. The sclera are anicteric. Nares and oropharynx are clear.   NECK: Supple. No adenopathy.  RESPIRATORY:   Inspection: Unlabored respirations, no intercostal retractions, paradoxical motion, or accessory muscle use.   Palpation:  The chest is nontender.    Auscultation: normal.  CARDIOVASCULAR:   Inspection: The skin is warm and dry.  Auscultation: Regular rate and rhythm.   Peripheral Pulses: Normal.   ABDOMEN:   Inspection: Abdominal inspection reveals no abrasions, contusions, lacerations or penetrating wounds.   Palpation: Palpation is remarkable for moderate tenderness in the right upper quadrant  region.   EXTREMITIES: Examination of the upper and lower extremities demonstrates no cyanosis edema or clubbing.  NEUROLOGIC: Alert & oriented x 3, Normal motor function, Normal sensory function, No focal deficits noted.  PSYCHIATRIC:   does not appear depressed or anxious.    LABORATORY VALUES:   Recent Labs     12/23/20  1251   WBC 13.9*   RBC 4.27   HEMOGLOBIN 13.2   HEMATOCRIT 39.0   MCV 91.3   MCH 30.9   MCHC 33.8   RDW 40.1   PLATELETCT 441   MPV 9.7     Recent Labs     12/23/20  1251   SODIUM 133*   POTASSIUM 3.6   CHLORIDE 96   CO2 21   GLUCOSE 138*   BUN 9   CREATININE  0.75   CALCIUM 9.6     Recent Labs     12/23/20  1251   ASTSGOT 23   ALTSGPT 21   TBILIRUBIN 0.2   ALKPHOSPHAT 116*   GLOBULIN 3.2            IMAGING:   US-RUQ   Final Result      1.  Contracted gallbladder with gallstones. No evidence of biliary ductal dilatation.      2.  The patient is clinically tender over the gallbladder which can be seen in the clinical setting of acute cholecystitis.      DX-CHEST-PORTABLE (1 VIEW)   Final Result      No evidence of acute cardiopulmonary process.           IMPRESSION AND PLAN:   [unfilled]    [facility] Guidelines for Acute Cholecystitis 2018  ACS NSQIP Surgical Risk Calculator    The patient has Grade II (moderate) acute cholecystitis. Plan: laparoscopic cholecystectomy.    The patient will be taken to the operating room for laparoscopic cholecystectomy. The surgical conduct was discussed in detail. Potential complications including, but not limited to, infection, bleeding, damage to adjacent structures, bile duct injury, need to convert to an open procedure, and anesthetic complications were discussed. Operative consent signed.  Admission SARS-CoV-2 testing negative. LOW RISK patient. Repeat SARS-CoV-2 testing not indicated. Isolation precautions de-escalated.     ____________________________________     Reggie Ritter M.D.    DD: 12/23/2020  5:10 PM

## 2020-12-31 ENCOUNTER — HOSPITAL ENCOUNTER (OUTPATIENT)
Dept: LAB | Facility: MEDICAL CENTER | Age: 40
End: 2020-12-31
Attending: FAMILY MEDICINE
Payer: COMMERCIAL

## 2020-12-31 DIAGNOSIS — R73.03 PREDIABETES: ICD-10-CM

## 2020-12-31 DIAGNOSIS — E55.9 VITAMIN D DEFICIENCY: ICD-10-CM

## 2020-12-31 DIAGNOSIS — R00.0 TACHYCARDIA: ICD-10-CM

## 2020-12-31 DIAGNOSIS — E78.2 MIXED HYPERLIPIDEMIA: ICD-10-CM

## 2020-12-31 LAB
25(OH)D3 SERPL-MCNC: 29 NG/ML (ref 30–100)
ALBUMIN SERPL BCP-MCNC: 3.8 G/DL (ref 3.2–4.9)
ALBUMIN/GLOB SERPL: 1.2 G/DL
ALP SERPL-CCNC: 101 U/L (ref 30–99)
ALT SERPL-CCNC: 20 U/L (ref 2–50)
ANION GAP SERPL CALC-SCNC: 10 MMOL/L (ref 7–16)
AST SERPL-CCNC: 14 U/L (ref 12–45)
BILIRUB SERPL-MCNC: 0.2 MG/DL (ref 0.1–1.5)
BUN SERPL-MCNC: 9 MG/DL (ref 8–22)
CALCIUM SERPL-MCNC: 9.2 MG/DL (ref 8.5–10.5)
CHLORIDE SERPL-SCNC: 101 MMOL/L (ref 96–112)
CHOLEST SERPL-MCNC: 228 MG/DL (ref 100–199)
CO2 SERPL-SCNC: 23 MMOL/L (ref 20–33)
CREAT SERPL-MCNC: 0.64 MG/DL (ref 0.5–1.4)
EST. AVERAGE GLUCOSE BLD GHB EST-MCNC: 126 MG/DL
FASTING STATUS PATIENT QL REPORTED: NORMAL
GLOBULIN SER CALC-MCNC: 3.2 G/DL (ref 1.9–3.5)
GLUCOSE SERPL-MCNC: 93 MG/DL (ref 65–99)
HBA1C MFR BLD: 6 % (ref 0–5.6)
HDLC SERPL-MCNC: 48 MG/DL
LDLC SERPL CALC-MCNC: 138 MG/DL
POTASSIUM SERPL-SCNC: 4.2 MMOL/L (ref 3.6–5.5)
PROT SERPL-MCNC: 7 G/DL (ref 6–8.2)
SODIUM SERPL-SCNC: 134 MMOL/L (ref 135–145)
TRIGL SERPL-MCNC: 208 MG/DL (ref 0–149)
TSH SERPL DL<=0.005 MIU/L-ACNC: 2.04 UIU/ML (ref 0.38–5.33)

## 2020-12-31 PROCEDURE — 80053 COMPREHEN METABOLIC PANEL: CPT

## 2020-12-31 PROCEDURE — 82306 VITAMIN D 25 HYDROXY: CPT

## 2020-12-31 PROCEDURE — 83036 HEMOGLOBIN GLYCOSYLATED A1C: CPT

## 2020-12-31 PROCEDURE — 36415 COLL VENOUS BLD VENIPUNCTURE: CPT

## 2020-12-31 PROCEDURE — 80061 LIPID PANEL: CPT

## 2020-12-31 PROCEDURE — 84443 ASSAY THYROID STIM HORMONE: CPT

## 2021-01-12 ENCOUNTER — TELEPHONE (OUTPATIENT)
Dept: MEDICAL GROUP | Facility: MEDICAL CENTER | Age: 41
End: 2021-01-12

## 2021-01-12 NOTE — TELEPHONE ENCOUNTER
ESTABLISHED PATIENT PRE-VISIT PLANNING     Patient was NOT contacted to complete PVP.     Note: Patient will not be contacted if there is no indication to call.     1.  Reviewed notes from the last few office visits within the medical group: Yes    2.  If any orders were placed at last visit or intended to be done for this visit (i.e. 6 mos follow-up), do we have Results/Consult Notes?         •  Labs - Labs ordered, completed on 12/23/2020 and results are in chart.  Note: If patient appointment is for lab review and patient did not complete labs, check with provider if OK to reschedule patient until labs completed.       •  Imaging - Imaging ordered, completed and results are in chart.       •  Referrals - No referrals were ordered at last office visit.    3. Is this appointment scheduled as a Hospital Follow-Up? No    4.  Immunizations were updated in Epic using Reconcile Outside Information activity? Yes    5.  Patient is due for the following Health Maintenance Topics:   Health Maintenance Due   Topic Date Due   • PAP SMEAR  06/11/2003     6.  AHA (Pulse8) form printed for Provider? N/A

## 2021-01-14 ENCOUNTER — OFFICE VISIT (OUTPATIENT)
Dept: MEDICAL GROUP | Facility: MEDICAL CENTER | Age: 41
End: 2021-01-14
Payer: COMMERCIAL

## 2021-01-14 VITALS
BODY MASS INDEX: 38.09 KG/M2 | SYSTOLIC BLOOD PRESSURE: 100 MMHG | HEART RATE: 100 BPM | TEMPERATURE: 97.8 F | HEIGHT: 60 IN | WEIGHT: 194 LBS | RESPIRATION RATE: 16 BRPM | OXYGEN SATURATION: 94 % | DIASTOLIC BLOOD PRESSURE: 70 MMHG

## 2021-01-14 DIAGNOSIS — E78.2 MIXED HYPERLIPIDEMIA: ICD-10-CM

## 2021-01-14 DIAGNOSIS — E55.9 VITAMIN D DEFICIENCY: ICD-10-CM

## 2021-01-14 DIAGNOSIS — R73.03 PREDIABETES: ICD-10-CM

## 2021-01-14 DIAGNOSIS — Z51.81 MEDICATION MONITORING ENCOUNTER: ICD-10-CM

## 2021-01-14 PROCEDURE — 99214 OFFICE O/P EST MOD 30 MIN: CPT | Performed by: FAMILY MEDICINE

## 2021-01-14 ASSESSMENT — ENCOUNTER SYMPTOMS
VOMITING: 0
MYALGIAS: 0
DIZZINESS: 0
ABDOMINAL PAIN: 0
CHILLS: 0
BLOOD IN STOOL: 0
SENSORY CHANGE: 0
HEMOPTYSIS: 0
HEADACHES: 0
SHORTNESS OF BREATH: 0
WHEEZING: 0
CONSTIPATION: 0
FEVER: 0
FOCAL WEAKNESS: 0
PALPITATIONS: 0
DIARRHEA: 0
COUGH: 0
NAUSEA: 0

## 2021-01-14 ASSESSMENT — PATIENT HEALTH QUESTIONNAIRE - PHQ9: CLINICAL INTERPRETATION OF PHQ2 SCORE: 0

## 2021-01-14 ASSESSMENT — FIBROSIS 4 INDEX: FIB4 SCORE: 0.27

## 2021-01-14 NOTE — PATIENT INSTRUCTIONS
· at healthy foods. This includes:  ? Eating fewer fatty foods, like fried foods.  ? Eating fewer refined carbs (refined carbohydrates). Refined carbs are breads and grains that are highly processed, like white bread and white rice. Instead, choose whole grains like whole-wheat bread and brown rice.  ? Eating more fiber. Almonds, fresh fruit, and beans are healthy sources of fiber.

## 2021-01-14 NOTE — PROGRESS NOTES
FAMILY MEDICINE VISIT                                                               Chief complaint::Diagnoses of Vitamin D deficiency, Prediabetes, Mixed hyperlipidemia, and Medication monitoring encounter were pertinent to this visit.    History of present illness: Beena Wilburn is a 40 y.o. female who presented for lab follow-up.    Mixed hyperlipidemia  This is a chronic health problem for this patient.  Recent labs showed improvement in triglyceride level.  HDL increased to 48.  LDL increased from 1 26 to 1 38  She is currently not on any statins.    Lab Results   Component Value Date/Time    CHOLSTRLTOT 228 (H) 12/31/2020 1031    TRIGLYCERIDE 208 (H) 12/31/2020 1031    HDL 48 12/31/2020 1031     (H) 12/31/2020 1031       Prediabetes  This is a chronic problem for this patient.  Her recent A1c came back at 6 which improved from 6.1 previously.  She is taking  metformin extended release daily which she reports had no side effects with it now.  Recently she was on regular Metformin and she was having excessive flatulence.    Vitamin D deficiency  This is a chronic problem for this patient.  Her recent blood work showed vitamin D improvement to 29 from 22.3.  She is taking vitamin D supplementation.      Review of systems:     Review of Systems   Constitutional: Negative for chills, fever and malaise/fatigue.   Respiratory: Negative for cough, hemoptysis, shortness of breath and wheezing.    Cardiovascular: Negative for chest pain, palpitations and leg swelling.   Gastrointestinal: Negative for abdominal pain, blood in stool, constipation, diarrhea, nausea and vomiting.   Musculoskeletal: Negative for myalgias.   Neurological: Negative for dizziness, sensory change, focal weakness and headaches.        Past Medical, Surgical and Family History:    Past Medical History:   Diagnosis Date   • Arthritis     hips   • Heart burn    • Migraine    • Other specified symptom associated with female genital  organs     ovarian cysts     Past Surgical History:   Procedure Laterality Date   • CHIARA BY LAPAROSCOPY N/A 12/23/2020    Procedure: CHOLECYSTECTOMY, LAPAROSCOPIC;  Surgeon: Reggie Ritter M.D.;  Location: SURGERY [facility];  Service: General   • PELVISCOPY N/A 7/11/2018    Procedure: PELVISCOPY;  Surgeon: Juliann Gale M.D.;  Location: SURGERY SAME DAY [facility];  Service: Gynecology   • SALPINGECTOMY Bilateral 7/11/2018    Procedure: SALPINGECTOMY;  Surgeon: Juliann Gale M.D.;  Location: SURGERY SAME DAY [facility];  Service: Gynecology   • HYSTEROSCOPY NOVASURE-2  9/2/2009    Performed by JULIANN GALE at SURGERY SAME DAY [facility]   • EYE SURGERY      Due to diplopia   • OTHER      Uterine ablation and tubal ligation   • PRIMARY C SECTION  2003,2007     Family History   Problem Relation Age of Onset   • Cancer Paternal Aunt         Breast cancer   • Cancer Paternal Aunt         Breast cancer   • No Known Problems Mother    • Cancer Maternal Aunt         Cervical cancer        Social History:    Social History     Tobacco Use   • Smoking status: Never Smoker   • Smokeless tobacco: Never Used   Substance Use Topics   • Alcohol use: Yes     Comment: occ   • Drug use: No        Medications and Allergies:     Current Outpatient Medications   Medication Sig Dispense Refill   • aspirin (NICHOLE ASPIRIN EXTRA STRENGTH) 500 MG tablet Take 1,000 mg by mouth as needed (For pain).     • Probiotic Product (PROBIOTIC DAILY PO) Take 1 Cap by mouth every day.     • acetaminophen (TYLENOL) 325 MG Tab Take 2 Tabs by mouth every 6 hours. Alternate w/ ibuprofen to take a medication every 3 hrs, take scheduled for 3 days post-op then PRN 60 Tab 0   • docusate sodium (COLACE) 100 MG Cap Take 1 Cap by mouth 2 times a day. While taking narcotics 60 Cap 0   • ibuprofen (MOTRIN) 600 MG Tab Take 1 Tab by mouth every 6 hours. Alternate w/ tylenol to take a medication every 3 hrs, take scheduled for 3 days post-op then PRN 45  Tab 0   • metFORMIN ER (GLUCOPHAGE XR) 500 MG TABLET SR 24 HR Take 1 Tab by mouth 2 times a day. 180 Tab 1   • vitamin D, Ergocalciferol, (DRISDOL) 1.25 MG (08937 UT) Cap capsule Take 1 Cap by mouth every 7 days. (Patient not taking: Reported on 12/23/2020) 12 Cap 1     No current facility-administered medications for this visit.         Allergies   Allergen Reactions   • Imitrex [Sumatriptan] Unspecified     Per pt it cause more pain   • Pcn [Penicillins] Hives     Pt reports when she was an infant she received hives        Vitals:    /70 (BP Location: Left arm, Patient Position: Sitting, BP Cuff Size: Adult)   Pulse 100   Temp 36.6 °C (97.8 °F)   Resp 16   Ht 1.524 m (5')   Wt 88 kg (194 lb 0.1 oz)   SpO2 94%  Body mass index is 37.89 kg/m².    Physical Exam:     Physical Exam   Constitutional: She is well-developed, well-nourished, and in no distress. No distress.   HENT:   Head: Normocephalic and atraumatic.   Eyes: Conjunctivae are normal.   Neck: Neck supple.   Cardiovascular: Normal rate.   Pulmonary/Chest: Effort normal. No respiratory distress.   Musculoskeletal:         General: No deformity or edema.   Neurological: She is alert. Gait normal.   Skin: No rash noted.   Psychiatric: Mood, affect and judgment normal.        Labs:  I reviewed with patient recent labs TSH, vitamin D, A1c, lipid panel, CMP resulted on 12/31/2020    Assessment/Plan:    Diagnoses and all orders for this visit:    Vitamin D deficiency  Improving, continue vitamin D 50,000 international units weekly.  Check vitamin D level in 6 months.    -     VITAMIN D,25 HYDROXY; Future    Prediabetes  Improving, advised healthy diet and aerobic excise 150 minutes in a week.  Recheck labs in 6 months.    -     Comp Metabolic Panel; Future  -     VITAMIN B12; Future    Mixed hyperlipidemia  Advised healthy diet and aerobic exercise 150 minutes in a week.  Patient recently had gallbladder surgery.  Advised to reduce fried food, eat  small frequent meals, drinks 64 ounces of water in a day, eat at least 3 hours before going to bed.  If in next blood work her cholesterols are not improving, will start statins.    -     Comp Metabolic Panel; Future  -     Lipid Profile; Future    Medication monitoring encounter  Check vitamin B12 as patient is on Metformin.    -     VITAMIN B12; Future       Patient reports that she always have elevated heart rate.  Reports that she had elevated heart rate during pregnancy also.  Initially her heart rate was 1/20/2020 but repeat came back 100.  She had an EKG done in ER which showed normal sinus rhythm.  We will monitor her closely.  If she remains tachycardic, will do further evaluation and will refer to cardiology.    Discussed with patient diagnosis, management options, and risk, benefits and alternative to treatment plan agreed upon.    Please note that this dictation was created using voice recognition software. I have made every reasonable attempt to correct obvious errors, but I expect that there are errors of grammar and possibly content that I did not discover before finalizing the note.    Follow up in 6 months for follow-up.

## 2021-01-15 NOTE — ASSESSMENT & PLAN NOTE
This is a chronic problem for this patient.  Her recent A1c came back at 6 which improved from 6.1 previously.  She is taking  metformin extended release daily which she reports had no side effects with it now.  Recently she was on regular Metformin and she was having excessive flatulence.

## 2021-01-15 NOTE — ASSESSMENT & PLAN NOTE
This is a chronic health problem for this patient.  Recent labs showed improvement in triglyceride level.  HDL increased to 48.  LDL increased from 1 26 to 1 38  She is currently not on any statins.    Lab Results   Component Value Date/Time    CHOLSTRLTOT 228 (H) 12/31/2020 1031    TRIGLYCERIDE 208 (H) 12/31/2020 1031    HDL 48 12/31/2020 1031     (H) 12/31/2020 1031

## 2021-01-15 NOTE — ASSESSMENT & PLAN NOTE
This is a chronic problem for this patient.  Her recent blood work showed vitamin D improvement to 29 from 22.3.  She is taking vitamin D supplementation.

## 2021-06-04 ENCOUNTER — HOSPITAL ENCOUNTER (OUTPATIENT)
Dept: LAB | Facility: MEDICAL CENTER | Age: 41
End: 2021-06-04
Attending: FAMILY MEDICINE
Payer: COMMERCIAL

## 2021-06-04 DIAGNOSIS — E78.2 MIXED HYPERLIPIDEMIA: ICD-10-CM

## 2021-06-04 DIAGNOSIS — E55.9 VITAMIN D DEFICIENCY: ICD-10-CM

## 2021-06-04 DIAGNOSIS — R73.03 PREDIABETES: ICD-10-CM

## 2021-06-04 DIAGNOSIS — Z51.81 MEDICATION MONITORING ENCOUNTER: ICD-10-CM

## 2021-06-04 LAB
ALBUMIN SERPL BCP-MCNC: 4 G/DL (ref 3.2–4.9)
ALBUMIN/GLOB SERPL: 1.2 G/DL
ALP SERPL-CCNC: 104 U/L (ref 30–99)
ALT SERPL-CCNC: 15 U/L (ref 2–50)
ANION GAP SERPL CALC-SCNC: 12 MMOL/L (ref 7–16)
AST SERPL-CCNC: 13 U/L (ref 12–45)
BILIRUB SERPL-MCNC: 0.2 MG/DL (ref 0.1–1.5)
BUN SERPL-MCNC: 7 MG/DL (ref 8–22)
CALCIUM SERPL-MCNC: 9.3 MG/DL (ref 8.5–10.5)
CHLORIDE SERPL-SCNC: 100 MMOL/L (ref 96–112)
CHOLEST SERPL-MCNC: 229 MG/DL (ref 100–199)
CO2 SERPL-SCNC: 25 MMOL/L (ref 20–33)
CREAT SERPL-MCNC: 0.66 MG/DL (ref 0.5–1.4)
EST. AVERAGE GLUCOSE BLD GHB EST-MCNC: 126 MG/DL
FASTING STATUS PATIENT QL REPORTED: NORMAL
GLOBULIN SER CALC-MCNC: 3.3 G/DL (ref 1.9–3.5)
GLUCOSE SERPL-MCNC: 101 MG/DL (ref 65–99)
HBA1C MFR BLD: 6 % (ref 4–5.6)
HDLC SERPL-MCNC: 42 MG/DL
LDLC SERPL CALC-MCNC: 145 MG/DL
POTASSIUM SERPL-SCNC: 4.3 MMOL/L (ref 3.6–5.5)
PROT SERPL-MCNC: 7.3 G/DL (ref 6–8.2)
SODIUM SERPL-SCNC: 137 MMOL/L (ref 135–145)
TRIGL SERPL-MCNC: 210 MG/DL (ref 0–149)
VIT B12 SERPL-MCNC: 301 PG/ML (ref 211–911)

## 2021-06-04 PROCEDURE — 82306 VITAMIN D 25 HYDROXY: CPT

## 2021-06-04 PROCEDURE — 82607 VITAMIN B-12: CPT

## 2021-06-04 PROCEDURE — 80061 LIPID PANEL: CPT

## 2021-06-04 PROCEDURE — 36415 COLL VENOUS BLD VENIPUNCTURE: CPT

## 2021-06-04 PROCEDURE — 83036 HEMOGLOBIN GLYCOSYLATED A1C: CPT

## 2021-06-04 PROCEDURE — 80053 COMPREHEN METABOLIC PANEL: CPT

## 2021-06-06 LAB — 25(OH)D3 SERPL-MCNC: 25 NG/ML (ref 30–80)

## 2021-06-08 ENCOUNTER — TELEPHONE (OUTPATIENT)
Dept: MEDICAL GROUP | Facility: MEDICAL CENTER | Age: 41
End: 2021-06-08

## 2021-06-08 ENCOUNTER — APPOINTMENT (OUTPATIENT)
Dept: MEDICAL GROUP | Facility: MEDICAL CENTER | Age: 41
End: 2021-06-08
Payer: COMMERCIAL

## 2021-06-08 NOTE — TELEPHONE ENCOUNTER
Phone Number Called: [phone]    Call outcome: Left detailed message for patient. Informed to call back with any additional questions.    Message:Left VM asking pt to call us back to reschedule her apt for today.

## 2021-06-25 DIAGNOSIS — R73.03 PREDIABETES: ICD-10-CM

## 2021-06-25 RX ORDER — METFORMIN HYDROCHLORIDE 500 MG/1
500 TABLET, EXTENDED RELEASE ORAL 2 TIMES DAILY
Qty: 180 TABLET | Refills: 2 | Status: SHIPPED | OUTPATIENT
Start: 2021-06-25

## 2021-06-28 ENCOUNTER — TELEPHONE (OUTPATIENT)
Dept: MEDICAL GROUP | Facility: MEDICAL CENTER | Age: 41
End: 2021-06-28

## 2021-06-28 NOTE — TELEPHONE ENCOUNTER
Phone Number Called: [phone]    Call outcome: Left detailed message for patient. Informed to call back with any additional questions.    Message: Reminding of apt

## 2021-06-29 ENCOUNTER — TELEMEDICINE (OUTPATIENT)
Dept: MEDICAL GROUP | Facility: MEDICAL CENTER | Age: 41
End: 2021-06-29
Payer: COMMERCIAL

## 2021-06-29 VITALS — WEIGHT: 196 LBS | HEIGHT: 60 IN | BODY MASS INDEX: 38.48 KG/M2

## 2021-06-29 DIAGNOSIS — R73.03 PREDIABETES: ICD-10-CM

## 2021-06-29 DIAGNOSIS — E78.2 MIXED HYPERLIPIDEMIA: ICD-10-CM

## 2021-06-29 DIAGNOSIS — E55.9 VITAMIN D DEFICIENCY: ICD-10-CM

## 2021-06-29 PROCEDURE — 99214 OFFICE O/P EST MOD 30 MIN: CPT | Mod: 95 | Performed by: FAMILY MEDICINE

## 2021-06-29 RX ORDER — ERGOCALCIFEROL 1.25 MG/1
50000 CAPSULE ORAL
Qty: 12 CAPSULE | Refills: 1 | Status: SHIPPED | OUTPATIENT
Start: 2021-06-29

## 2021-06-29 RX ORDER — ROSUVASTATIN CALCIUM 10 MG/1
10 TABLET, COATED ORAL EVERY EVENING
Qty: 90 TABLET | Refills: 1 | Status: SHIPPED | OUTPATIENT
Start: 2021-06-29

## 2021-06-29 ASSESSMENT — FIBROSIS 4 INDEX: FIB4 SCORE: 0.3

## 2021-06-29 NOTE — ASSESSMENT & PLAN NOTE
This is a chronic health problem for this patient.  She is on Metformin 500 mg twice daily.  Recent A1c came back at same range at 6.0.

## 2021-06-29 NOTE — ASSESSMENT & PLAN NOTE
This is a chronic health problem for this patient.  Vitamin D level came back at 25.  She was on high-dose, she finished this prescription and she is currently on vitamin D 1000 international units daily.

## 2021-06-29 NOTE — PROGRESS NOTES
Virtual Visit: Established Patient   This visit was conducted via Zoom using secure and encrypted videoconferencing technology. The patient was in a private location in the state of [place].    The patient's identity was confirmed and verbal consent was obtained for this virtual visit.    Subjective:   CC:   Chief Complaint   Patient presents with   • Lab Results   • Other     havent been taking meds since thursday       Beena Wilburn is a 41 y.o. female presenting for lab results, medications.    Mixed hyperlipidemia  This is a chronic health problem for this patient. She reports that she has been in a lot of stress and she has not been working on her diet.  She is not exercising.  Her recent blood work showed elevated total cholesterol and LDL level.    Lab Results   Component Value Date/Time    CHOLSTRLTOT 229 (H) 06/04/2021 0851    TRIGLYCERIDE 210 (H) 06/04/2021 0851    HDL 42 06/04/2021 0851     (H) 06/04/2021 0851       Prediabetes  This is a chronic health problem for this patient.  She is on Metformin 500 mg twice daily.  Recent A1c came back at same range at 6.0.    Vitamin D deficiency  This is a chronic health problem for this patient.  Vitamin D level came back at 25.  She was on high-dose, she finished this prescription and she is currently on vitamin D 1000 international units daily.        ROS   Denies any recent fevers or chills. No nausea or vomiting. No chest pains or shortness of breath.     Allergies   Allergen Reactions   • Imitrex [Sumatriptan] Unspecified     Per pt it cause more pain   • Pcn [Penicillins] Hives     Pt reports when she was an infant she received hives        Current medicines (including changes today)  Current Outpatient Medications   Medication Sig Dispense Refill   • rosuvastatin (CRESTOR) 10 MG Tab Take 1 tablet by mouth every evening. 90 tablet 1   • vitamin D, Ergocalciferol, (DRISDOL) 1.25 MG (70938 UT) Cap capsule Take 1 capsule by mouth every 7 days. 12  capsule 1   • Probiotic Product (PROBIOTIC DAILY PO) Take 1 Cap by mouth every day.     • metFORMIN ER (GLUCOPHAGE XR) 500 MG TABLET SR 24 HR Take 1 tablet by mouth 2 times a day. 180 tablet 2     No current facility-administered medications for this visit.       Patient Active Problem List    Diagnosis Date Noted   • Postoperative pain 12/23/2020   • Prediabetes 09/10/2020   • Mixed hyperlipidemia 09/10/2020   • Vitamin D deficiency 09/10/2020   • Seasonal allergies 09/10/2020   • Recurrent UTI 09/10/2020   • Class 2 obesity due to excess calories without serious comorbidity with body mass index (BMI) of 37.0 to 37.9 in adult 09/10/2020       Family History   Problem Relation Age of Onset   • Cancer Paternal Aunt         Breast cancer   • Cancer Paternal Aunt         Breast cancer   • No Known Problems Mother    • Cancer Maternal Aunt         Cervical cancer       She  has a past medical history of Arthritis, Heart burn, Migraine, and Other specified symptom associated with female genital organs. She also has no past medical history of Breast cancer (HCC).  She  has a past surgical history that includes primary c section (2003,2007); hysteroscopy novasure-2 (9/2/2009); pelviscopy (N/A, 7/11/2018); salpingectomy (Bilateral, 7/11/2018); other; eye surgery; and julissa by laparoscopy (N/A, 12/23/2020).       Objective:   Ht 1.524 m (5')   Wt 88.9 kg (196 lb)   BMI 38.28 kg/m²     Physical Exam:  Constitutional: Alert, no distress, well-groomed.  Skin: No rashes in visible areas.  Eye: Round. Conjunctiva clear, lids normal. No icterus.   ENMT: Lips pink without lesions, good dentition, moist mucous membranes. Phonation normal.  Neck: No masses, no thyromegaly. Moves freely without pain.  Respiratory: Unlabored respiratory effort, no cough or audible wheeze  Psych: Alert and oriented x3, normal affect and mood.     I reviewed recent blood work A1c, lipid panel, vitamin B12, vitamin D, CMP, GFR on 6/4/2021.    Assessment  and Plan:   The following treatment plan was discussed:     1. Prediabetes  Stable, continue Metformin 500 mg twice daily.  Eat healthy diet and aerobic exercise 150 minutes in a bag.  She was going to move to [place] soon.    2. Mixed hyperlipidemia  Uncontrolled, start Crestor 10 mg once daily.    - rosuvastatin (CRESTOR) 10 MG Tab; Take 1 tablet by mouth every evening.  Dispense: 90 tablet; Refill: 1    3. Vitamin D deficiency  Uncontrolled, start vitamin D 50,000 units every 7 days.    - vitamin D, Ergocalciferol, (DRISDOL) 1.25 MG (39491 UT) Cap capsule; Take 1 capsule by mouth every 7 days.  Dispense: 12 capsule; Refill: 1        Follow-up: Return if symptoms worsen or fail to improve.

## 2021-06-29 NOTE — ASSESSMENT & PLAN NOTE
This is a chronic health problem for this patient. She reports that she has been in a lot of stress and she has not been working on her diet.  She is not exercising.  Her recent blood work showed elevated total cholesterol and LDL level.    Lab Results   Component Value Date/Time    CHOLSTRLTOT 229 (H) 06/04/2021 0851    TRIGLYCERIDE 210 (H) 06/04/2021 0851    HDL 42 06/04/2021 0851     (H) 06/04/2021 0851

## 2021-07-18 ENCOUNTER — APPOINTMENT (OUTPATIENT)
Dept: RADIOLOGY | Facility: MEDICAL CENTER | Age: 41
End: 2021-07-18
Attending: EMERGENCY MEDICINE
Payer: COMMERCIAL

## 2021-07-18 ENCOUNTER — HOSPITAL ENCOUNTER (EMERGENCY)
Facility: MEDICAL CENTER | Age: 41
End: 2021-07-18
Attending: EMERGENCY MEDICINE
Payer: COMMERCIAL

## 2021-07-18 VITALS
WEIGHT: 198.41 LBS | HEART RATE: 91 BPM | RESPIRATION RATE: 18 BRPM | SYSTOLIC BLOOD PRESSURE: 123 MMHG | HEIGHT: 60 IN | BODY MASS INDEX: 38.95 KG/M2 | OXYGEN SATURATION: 95 % | DIASTOLIC BLOOD PRESSURE: 82 MMHG | TEMPERATURE: 97 F

## 2021-07-18 DIAGNOSIS — L08.9 INFECTED ABRASION OF LEFT LOWER EXTREMITY, INITIAL ENCOUNTER: ICD-10-CM

## 2021-07-18 DIAGNOSIS — S80.812A INFECTED ABRASION OF LEFT LOWER EXTREMITY, INITIAL ENCOUNTER: ICD-10-CM

## 2021-07-18 DIAGNOSIS — S93.402A SPRAIN OF LEFT ANKLE, UNSPECIFIED LIGAMENT, INITIAL ENCOUNTER: ICD-10-CM

## 2021-07-18 PROCEDURE — 99284 EMERGENCY DEPT VISIT MOD MDM: CPT

## 2021-07-18 PROCEDURE — 700102 HCHG RX REV CODE 250 W/ 637 OVERRIDE(OP): Performed by: EMERGENCY MEDICINE

## 2021-07-18 PROCEDURE — 73610 X-RAY EXAM OF ANKLE: CPT | Mod: LT

## 2021-07-18 PROCEDURE — A9270 NON-COVERED ITEM OR SERVICE: HCPCS | Performed by: EMERGENCY MEDICINE

## 2021-07-18 PROCEDURE — 73630 X-RAY EXAM OF FOOT: CPT | Mod: LT

## 2021-07-18 RX ORDER — SULFAMETHOXAZOLE AND TRIMETHOPRIM 800; 160 MG/1; MG/1
1 TABLET ORAL ONCE
Status: COMPLETED | OUTPATIENT
Start: 2021-07-18 | End: 2021-07-18

## 2021-07-18 RX ORDER — SULFAMETHOXAZOLE AND TRIMETHOPRIM 800; 160 MG/1; MG/1
1 TABLET ORAL 2 TIMES DAILY
Qty: 14 TABLET | Refills: 0 | Status: SHIPPED | OUTPATIENT
Start: 2021-07-18 | End: 2021-07-25

## 2021-07-18 RX ORDER — IBUPROFEN 600 MG/1
600 TABLET ORAL ONCE
Status: COMPLETED | OUTPATIENT
Start: 2021-07-18 | End: 2021-07-18

## 2021-07-18 RX ORDER — IBUPROFEN 800 MG/1
800 TABLET ORAL EVERY 8 HOURS PRN
Qty: 30 TABLET | Refills: 0 | Status: SHIPPED | OUTPATIENT
Start: 2021-07-18

## 2021-07-18 RX ADMIN — SULFAMETHOXAZOLE AND TRIMETHOPRIM 1 TABLET: 800; 160 TABLET ORAL at 14:38

## 2021-07-18 RX ADMIN — IBUPROFEN 600 MG: 600 TABLET, FILM COATED ORAL at 14:38

## 2021-07-18 ASSESSMENT — FIBROSIS 4 INDEX: FIB4 SCORE: 0.3

## 2021-07-18 NOTE — ED PROVIDER NOTES
ED Provider Note    CHIEF COMPLAINT  Chief Complaint   Patient presents with   • T-5000 FALL   • Leg Injury       HPI  Beena Wilburn is a 41 y.o. female who presents for evaluation of acute injury to the left ankle and foot. The patient reports that she was at the dog park and her dog accidentally pulled her down to the ground. She twisted her left foot and ankle. No injury to the head neck chest abdomen or pelvis. She sustained superficial abrasions to the left lateral leg and proximal knee. She reports pain in her foot and ankle with ambulation no pain specifically in the knee or the hip. No other complaints reported. She reports her tubes are tied and that she is not pregnant and she reports that her tetanus is up-to-date. Injury occurred yesterday    REVIEW OF SYSTEMS  See HPI for further details. All other systems are negative.     PAST MEDICAL HISTORY  Past Medical History:   Diagnosis Date   • Arthritis     hips   • Heart burn    • Migraine    • Other specified symptom associated with female genital organs     ovarian cysts       FAMILY HISTORY  Noncontributory    SOCIAL HISTORY  Social History     Socioeconomic History   • Marital status: Single     Spouse name: Not on file   • Number of children: Not on file   • Years of education: Not on file   • Highest education level: Not on file   Occupational History   • Not on file   Tobacco Use   • Smoking status: Never Smoker   • Smokeless tobacco: Never Used   Substance and Sexual Activity   • Alcohol use: Yes     Comment: Occasionally   • Drug use: No   • Sexual activity: Yes     Partners: Male     Comment: , Work in IT sales   Other Topics Concern   • Not on file   Social History Narrative   • Not on file     Social Determinants of Health     Financial Resource Strain:    • Difficulty of Paying Living Expenses:    Food Insecurity:    • Worried About Running Out of Food in the Last Year:    • Ran Out of Food in the Last Year:    Transportation  Needs:    • Lack of Transportation (Medical):    • Lack of Transportation (Non-Medical):    Physical Activity:    • Days of Exercise per Week:    • Minutes of Exercise per Session:    Stress:    • Feeling of Stress :    Social Connections:    • Frequency of Communication with Friends and Family:    • Frequency of Social Gatherings with Friends and Family:    • Attends [employer] Services:    • Active Member of Clubs or Organizations:    • Attends Club or Organization Meetings:    • Marital Status:    Intimate Partner Violence:    • Fear of Current or Ex-Partner:    • Emotionally Abused:    • Physically Abused:    • Sexually Abused:        SURGICAL HISTORY  Past Surgical History:   Procedure Laterality Date   • CHIARA BY LAPAROSCOPY N/A 12/23/2020    Procedure: CHOLECYSTECTOMY, LAPAROSCOPIC;  Surgeon: Reggie Ritter M.D.;  [facility];  Service: General   • PELVISCOPY N/A 7/11/2018    Procedure: PELVISCOPY;  Surgeon: Juliann Gale M.D.;  Location: [facility]ERY [facility];  Service: Gynecology   • SALPINGECTOMY Bilateral 7/11/2018    Procedure: SALPINGECTOMY;  Surgeon: Juliann Gale M.D.;  Location: [facility]ERY [facility];  Service: Gynecology   • HYSTEROSCOPY NOVASURE-2  9/2/2009    Performed by JULIANN GALE at SUR[facility]ERY [facility]   • EYE SURGERY      Due to diplopia   • OTHER      Uterine ablation and tubal ligation   • PRIMARY C SECTION  2003,2007       CURRENT MEDICATIONS  Home Medications    **Home medications have not yet been reviewed for this encounter**     No current facility-administered medications for this encounter.    Current Outpatient Medications:   •  rosuvastatin (CRESTOR) 10 MG Tab, Take 1 tablet by mouth every evening., Disp: 90 tablet, Rfl: 1  •  vitamin D, Ergocalciferol, (DRISDOL) 1.25 MG (83656 UT) Cap capsule, Take 1 capsule by mouth every 7 days., Disp: 12 capsule, Rfl: 1  •  metFORMIN ER (GLUCOPHAGE XR) 500 MG TABLET SR 24 HR, Take 1 tablet  by mouth 2 times a day., Disp: 180 tablet, Rfl: 2  •  Probiotic Product (PROBIOTIC DAILY PO), Take 1 Cap by mouth every day., Disp: , Rfl:       ALLERGIES  Allergies   Allergen Reactions   • Imitrex [Sumatriptan] Unspecified     Per pt it cause more pain   • Pcn [Penicillins] Hives     Pt reports when she was an infant she received hives        PHYSICAL EXAM  VITAL SIGNS: /85   Pulse 90   Temp 36.1 °C (97 °F) (Temporal)   Resp 20   Ht 1.524 m (5')   Wt 90 kg (198 lb 6.6 oz)   SpO2 96%   BMI 38.75 kg/m²       Constitutional: Well developed, Well nourished, No acute distress, Non-toxic appearance.   HENT: Normocephalic, Atraumatic, Bilateral external ears normal, Oropharynx moist, No oral exudates, Nose normal.   Eyes: PERRLA, EOMI, Conjunctiva normal, No discharge.   Neck: Normal range of motion, No tenderness, Supple, No stridor.   Cardiovascular: Normal heart rate, Normal rhythm, No murmurs, No rubs, No gallops.   Thorax & Lungs: Normal breath sounds, No respiratory distress, No wheezing, No chest tenderness.   Abdomen: Bowel sounds normal, Soft, No tenderness, No masses, No pulsatile masses.   Skin: Warm, Dry, No erythema, No rash.   Extremities: Superficial abrasions approximately 2% body surface area to the left lateral leg no exposed tendon or bone. Bony tenderness noted to the lateral malleolus and the dorsal aspect of the left foot overlying the first and second metatarsal no midfoot instability   neurologic: Alert & oriented x 3, Normal motor function, Normal sensory function, No focal deficits noted.   Psychiatric: Affect normal, Judgment normal, Mood normal.   DX-FOOT-COMPLETE 3+ LEFT   Final Result      Dorsal soft tissue swelling without evidence of fracture.      DX-ANKLE 3+ VIEWS LEFT   Final Result      Lateral soft tissue swelling without evidence of fracture.            COURSE & MEDICAL DECISION MAKING  Pertinent Labs & Imaging studies reviewed. (See chart for details)  Patient has no  suggestion of fracture or dislocation. Neurovascular exam is normal including dorsalis pedal pulse compartments of the leg. She has superficial abrasions that do not appear to hav significant secondary infection but could have early cellulitis.  I will start her on Bactrim.  I applied nonadhesive dressings and will start her on Bactrim and ibuprofen.    FINAL IMPRESSION  1.  Left foot and ankle sprain  2.  Superficial abrasions with possible early infection to the left leg         Electronically signed by: Nasir Mccurdy M.D., 7/18/2021 1:50 PM

## 2021-07-18 NOTE — ED NOTES
No fractures per xray. Dressing to left lower leg and foot. Patient able to ambulate without difficulty. Discharged to home with instructions. Prescriptions to pharmacy. Patient will follow up with her doctor.

## 2021-07-18 NOTE — Clinical Note
Beena Wilburn was seen and treated in our emergency department on 7/18/2021.  She may return to work on 07/22/2021.       If you have any questions or concerns, please don't hesitate to call.      Nasir Mccurdy M.D.

## 2021-07-18 NOTE — ED TRIAGE NOTES
Presents complaining of left lower leg pain with abrasions after incurring a GLF yesterday.  Chief Complaint   Patient presents with   • T-5000 FALL   • Leg Injury     /85   Pulse 90   Temp 36.1 °C (97 °F) (Temporal)   Resp 20   Ht 1.524 m (5')   Wt 90 kg (198 lb 6.6 oz)   SpO2 96%   BMI 38.75 kg/m²

## 2021-07-26 ENCOUNTER — TELEPHONE (OUTPATIENT)
Dept: MEDICAL GROUP | Facility: MEDICAL CENTER | Age: 41
End: 2021-07-26

## 2021-07-26 NOTE — TELEPHONE ENCOUNTER
Called pt to schedule appt with Dr. Mckoy after recent ED visit, however pt moved to [place]. Pt stated she will go to urgent care if she sees signs of infection from her fall/abrasions.

## 2024-07-25 ENCOUNTER — HOSPITAL ENCOUNTER (OUTPATIENT)
Facility: MEDICAL CENTER | Age: 44
End: 2024-07-25
Attending: PHYSICIAN ASSISTANT
Payer: COMMERCIAL

## 2024-07-25 ENCOUNTER — OFFICE VISIT (OUTPATIENT)
Dept: URGENT CARE | Facility: PHYSICIAN GROUP | Age: 44
End: 2024-07-25
Payer: COMMERCIAL

## 2024-07-25 VITALS
HEART RATE: 92 BPM | WEIGHT: 189 LBS | OXYGEN SATURATION: 96 % | DIASTOLIC BLOOD PRESSURE: 70 MMHG | HEIGHT: 61 IN | RESPIRATION RATE: 16 BRPM | SYSTOLIC BLOOD PRESSURE: 102 MMHG | BODY MASS INDEX: 35.68 KG/M2 | TEMPERATURE: 97.3 F

## 2024-07-25 DIAGNOSIS — R30.0 DYSURIA: ICD-10-CM

## 2024-07-25 DIAGNOSIS — Z87.440 HISTORY OF RECURRENT UTIS: ICD-10-CM

## 2024-07-25 DIAGNOSIS — N39.0 ACUTE UTI: ICD-10-CM

## 2024-07-25 DIAGNOSIS — N39.0 ACUTE UTI: Primary | ICD-10-CM

## 2024-07-25 PROBLEM — M25.521 RIGHT ELBOW PAIN: Status: ACTIVE | Noted: 2022-09-29

## 2024-07-25 PROBLEM — B96.89 URINARY TRACT INFECTION DUE TO ESBL KLEBSIELLA: Status: ACTIVE | Noted: 2024-01-18

## 2024-07-25 PROBLEM — E78.5 HYPERLIPIDEMIA: Status: ACTIVE | Noted: 2020-09-10

## 2024-07-25 PROBLEM — M79.642 BILATERAL HAND PAIN: Status: ACTIVE | Noted: 2022-09-29

## 2024-07-25 PROBLEM — F32.A ANXIETY AND DEPRESSION: Status: ACTIVE | Noted: 2023-05-05

## 2024-07-25 PROBLEM — M25.552 HIP PAIN, BILATERAL: Status: ACTIVE | Noted: 2022-09-29

## 2024-07-25 PROBLEM — F41.9 ANXIETY AND DEPRESSION: Status: ACTIVE | Noted: 2023-05-05

## 2024-07-25 PROBLEM — R73.02 GLUCOSE INTOLERANCE (IMPAIRED GLUCOSE TOLERANCE): Status: ACTIVE | Noted: 2024-07-25

## 2024-07-25 PROBLEM — M79.641 BILATERAL HAND PAIN: Status: ACTIVE | Noted: 2022-09-29

## 2024-07-25 PROBLEM — M25.551 HIP PAIN, BILATERAL: Status: ACTIVE | Noted: 2022-09-29

## 2024-07-25 PROBLEM — E66.9 OBESITY (BMI 35.0-39.9 WITHOUT COMORBIDITY): Status: ACTIVE | Noted: 2024-07-25

## 2024-07-25 LAB
APPEARANCE UR: CLEAR
BILIRUB UR STRIP-MCNC: NEGATIVE MG/DL
COLOR UR AUTO: NORMAL
GLUCOSE UR STRIP.AUTO-MCNC: NEGATIVE MG/DL
KETONES UR STRIP.AUTO-MCNC: NEGATIVE MG/DL
LEUKOCYTE ESTERASE UR QL STRIP.AUTO: NEGATIVE
NITRITE UR QL STRIP.AUTO: POSITIVE
PH UR STRIP.AUTO: 5.5 [PH] (ref 5–8)
PROT UR QL STRIP: NEGATIVE MG/DL
RBC UR QL AUTO: NEGATIVE
SP GR UR STRIP.AUTO: 1.01
UROBILINOGEN UR STRIP-MCNC: 0.2 MG/DL

## 2024-07-25 PROCEDURE — 81002 URINALYSIS NONAUTO W/O SCOPE: CPT | Performed by: PHYSICIAN ASSISTANT

## 2024-07-25 PROCEDURE — 3078F DIAST BP <80 MM HG: CPT | Performed by: PHYSICIAN ASSISTANT

## 2024-07-25 PROCEDURE — 99203 OFFICE O/P NEW LOW 30 MIN: CPT | Performed by: PHYSICIAN ASSISTANT

## 2024-07-25 PROCEDURE — 87086 URINE CULTURE/COLONY COUNT: CPT

## 2024-07-25 PROCEDURE — 3074F SYST BP LT 130 MM HG: CPT | Performed by: PHYSICIAN ASSISTANT

## 2024-07-25 RX ORDER — SERTRALINE HYDROCHLORIDE 100 MG/1
100 TABLET, FILM COATED ORAL DAILY
COMMUNITY

## 2024-07-25 RX ORDER — FLUCONAZOLE 100 MG/1
100 TABLET ORAL DAILY
Qty: 2 TABLET | Refills: 0 | Status: SHIPPED | OUTPATIENT
Start: 2024-07-25 | End: 2024-07-27

## 2024-07-25 RX ORDER — CIPROFLOXACIN 500 MG/1
500 TABLET, FILM COATED ORAL 2 TIMES DAILY
Qty: 14 TABLET | Refills: 0 | Status: SHIPPED | OUTPATIENT
Start: 2024-07-25 | End: 2024-08-01

## 2024-07-25 ASSESSMENT — ENCOUNTER SYMPTOMS
ANOREXIA: 0
FLANK PAIN: 0
NAUSEA: 0
FEVER: 0

## 2024-07-28 LAB
BACTERIA UR CULT: NORMAL
SIGNIFICANT IND 70042: NORMAL
SITE SITE: NORMAL
SOURCE SOURCE: NORMAL

## (undated) DEVICE — CATHETER IV 20 GA X 1-1/4 ---SURG.& SDS ONLY--- (50EA/BX)

## (undated) DEVICE — CANISTER SUCTION RIGID RED 1500CC (40EA/CA)

## (undated) DEVICE — SUTURE 0 VICRYL PLUS UR-6 - 27 INCH (36/BX)

## (undated) DEVICE — TUBING CLEARLINK DUO-VENT - C-FLO (48EA/CA)

## (undated) DEVICE — TROCAR 5X100 BLADED Z-THREAD - KII (6/BX)

## (undated) DEVICE — APPLIER 5MM MED/LARGE CLIP - (3/BX)

## (undated) DEVICE — KIT  I.V. START (100EA/CA)

## (undated) DEVICE — SUTURE GENERAL

## (undated) DEVICE — WATER IRRIGATION STERILE 1000ML (12EA/CA)

## (undated) DEVICE — HEAD HOLDER JUNIOR/ADULT

## (undated) DEVICE — SUCTION INSTRUMENT YANKAUER BULBOUS TIP W/O VENT (50EA/CA)

## (undated) DEVICE — SPONGE GAUZESTER. 2X2 4-PL - (2/PK 50PK/BX 30BX/CS)

## (undated) DEVICE — CHLORAPREP 26 ML APPLICATOR - ORANGE TINT(25/CA)

## (undated) DEVICE — MASK ANESTHESIA ADULT  - (100/CA)

## (undated) DEVICE — DERMABOND ADVANCED - (12EA/BX)

## (undated) DEVICE — PROTECTOR ULNA NERVE - (36PR/CA)

## (undated) DEVICE — SLEEVE, VASO, THIGH, MED

## (undated) DEVICE — PAD SANITARY 11IN MAXI IND WRAPPED  (12EA/PK 24PK/CA)

## (undated) DEVICE — GLOVE BIOGEL SZ 8 SURGICAL PF LTX - (50PR/BX 4BX/CA)

## (undated) DEVICE — LIGASURE 5MM BLUNT TIP LONG - 44CM (6EA/PK)

## (undated) DEVICE — LACTATED RINGERS INJ 1000 ML - (14EA/CA 60CA/PF)

## (undated) DEVICE — SCISSORS 5MM CVD (6EA/BX)

## (undated) DEVICE — ELECTRODE 850 FOAM ADHESIVE - HYDROGEL RADIOTRNSPRNT (50/PK)

## (undated) DEVICE — TRAY SRGPRP PVP IOD WT PRP - (20/CA)

## (undated) DEVICE — SUTURE 4-0 VICRYL PLUS FS-2 - 27 INCH (36/BX)

## (undated) DEVICE — Device

## (undated) DEVICE — GLOVE BIOGEL INDICATOR SZ 8 SURGICAL PF LTX - (50/BX 4BX/CA)

## (undated) DEVICE — PACK LAPAROSCOPY - (1/CA)

## (undated) DEVICE — NEEDLE INSFL 120MM 14GA VRRS - (20/BX)

## (undated) DEVICE — CANNULA W/SEAL 5X100 Z-THRE - ADED KII (12/BX)

## (undated) DEVICE — NEPTUNE 4 PORT MANIFOLD - (20/PK)

## (undated) DEVICE — DETERGENT RENUZYME PLUS 10 OZ PACKET (50/BX)

## (undated) DEVICE — TROCAR Z THREAD 11 X 100 - BLADED (6/BX)

## (undated) DEVICE — SUTURE 4-0 MONOCRYL PLUS PS-2 - 27 INCH (36/BX)

## (undated) DEVICE — TUBE E-T HI-LO CUFF 7.5MM (10EA/PK)

## (undated) DEVICE — SODIUM CHL IRRIGATION 0.9% 1000ML (12EA/CA)

## (undated) DEVICE — GOWN SURGEONS X-LARGE - DISP. (30/CA)

## (undated) DEVICE — SENSOR SPO2 NEO LNCS ADHESIVE (20/BX) SEE USER NOTES

## (undated) DEVICE — TROCAR 5X100 NON BLADED Z-TH - READ KII (6/BX)

## (undated) DEVICE — TROCAR SEPARATOR 5X55 - 6/BX

## (undated) DEVICE — BLADE SURGICAL #15 - (50/BX 3BX/CA)

## (undated) DEVICE — GLOVE BIOGEL PI INDICATOR SZ 7.0 SURGICAL PF LF - (50/BX 4BX/CA)

## (undated) DEVICE — ELECTRODE DUAL RETURN W/ CORD - (50/PK)

## (undated) DEVICE — GLOVE SZ 7.5 LF PROTEXIS (50PR/BX)

## (undated) DEVICE — DRESSING TRANSPARENT FILM TEGADERM 2.375 X 2.75"  (100EA/BX)"

## (undated) DEVICE — GLOVE BIOGEL SZ 6.5 SURGICAL PF LTX (50PR/BX 4BX/CA)

## (undated) DEVICE — SET EXTENSION WITH 2 PORTS (48EA/CA) ***PART #2C8610 IS A SUBSTITUTE*****

## (undated) DEVICE — CATHETER CHOLANGIOGRAM TAUT - (10/BX)

## (undated) DEVICE — CANISTER SUCTION 3000ML MECHANICAL FILTER AUTO SHUTOFF MEDI-VAC NONSTERILE LF DISP  (40EA/CA)

## (undated) DEVICE — GLOVE BIOGEL PI ORTHO SZ 8.5 PF LF (40/BX)

## (undated) DEVICE — GOWN WARMING STANDARD FLEX - (30/CA)

## (undated) DEVICE — GLOVE BIOGEL PI ULTRATOUCH SZ 7.5 SURGICAL PF LF -(50/BX 4BX/CA)

## (undated) DEVICE — TROCAR LAPSCP 100MM 12MM NTHRD - (6/BX)

## (undated) DEVICE — CLOSURE WOUND 1/4 X 4 (STERI - STRIP) (50/BX 4BX/CA)

## (undated) DEVICE — TUBING SETDISPOS HIGH FLOW II - (10/BX)

## (undated) DEVICE — ARMREST CRADLE FOAM - (2PR/PK 12PR/CA)

## (undated) DEVICE — SET LEADWIRE 5 LEAD BEDSIDE DISPOSABLE ECG (1SET OF 5/EA)

## (undated) DEVICE — SUTURE 2-0 VICRYL PLUS CT-1 36 (36PK/BX)"

## (undated) DEVICE — KIT ANESTHESIA W/CIRCUIT & 3/LT BAG W/FILTER (20EA/CA)

## (undated) DEVICE — SCOPE WARMER DISP. DAVINCI - (DAVINCI)

## (undated) DEVICE — TUBE CONNECTING SUCTION - CLEAR PLASTIC STERILE 72 IN (50EA/CA)